# Patient Record
Sex: FEMALE | Race: WHITE | Employment: UNEMPLOYED | URBAN - METROPOLITAN AREA
[De-identification: names, ages, dates, MRNs, and addresses within clinical notes are randomized per-mention and may not be internally consistent; named-entity substitution may affect disease eponyms.]

---

## 2017-02-22 ENCOUNTER — OFFICE VISIT (OUTPATIENT)
Dept: FAMILY MEDICINE CLINIC | Age: 67
End: 2017-02-22

## 2017-02-22 VITALS
RESPIRATION RATE: 16 BRPM | HEIGHT: 60 IN | SYSTOLIC BLOOD PRESSURE: 116 MMHG | BODY MASS INDEX: 19.83 KG/M2 | HEART RATE: 59 BPM | TEMPERATURE: 95.7 F | OXYGEN SATURATION: 99 % | WEIGHT: 101 LBS | DIASTOLIC BLOOD PRESSURE: 55 MMHG

## 2017-02-22 DIAGNOSIS — Z13.39 SCREENING FOR ALCOHOLISM: ICD-10-CM

## 2017-02-22 DIAGNOSIS — Z71.89 ADVANCE DIRECTIVE DISCUSSED WITH PATIENT: ICD-10-CM

## 2017-02-22 DIAGNOSIS — Z00.00 ROUTINE GENERAL MEDICAL EXAMINATION AT A HEALTH CARE FACILITY: Primary | ICD-10-CM

## 2017-02-22 DIAGNOSIS — M81.0 OSTEOPOROSIS: ICD-10-CM

## 2017-02-22 NOTE — ACP (ADVANCE CARE PLANNING)
Advance Care Planning (ACP) Provider Conversation Snapshot    Date of ACP Conversation: 02/22/17  Persons included in Conversation:  patient and family  Length of ACP Conversation in minutes:  <16 minutes (Non-Billable)    Authorized Decision Maker (if patient is incapable of making informed decisions): This person is:    Other Legally Authorized Decision Maker (e.g. Next of Kin)her , Mindi Lung          For Patients with Decision Making Capacity:   Values/Goals: Exploration of values, goals, and preferences if recovery is not expected, even with continued medical treatment in the event of:  Imminent death  Severe, permanent brain injury    Conversation Outcomes / Follow-Up Plan:   Recommended completion of Advance Directive form after review of ACP materials and conversation with prospective healthcare agent

## 2017-02-22 NOTE — PATIENT INSTRUCTIONS

## 2017-02-22 NOTE — MR AVS SNAPSHOT
Visit Information Date & Time Provider Department Dept. Phone Encounter #  
 2/22/2017 10:45 AM Hayes Hines MD [facility] [address] [id number] Follow-up Instructions Return in about 3 months (around 5/22/2017) for follow up. Follow-up and Disposition History Your Appointments 5/22/2017 10:30 AM  
Follow Up with Hayes Hines MD  
[place] [address] ([facility]) Appt Note: 3 mo f/u  
 [facility] [address]  
  
   
 [address] [facility] Upcoming Health Maintenance Date Due Pneumococcal 65+ Low/Medium Risk (2 of 2 - PPSV23) 1/15/2015 GLAUCOMA SCREENING Q2Y 1/2/2017 BREAST CANCER SCRN MAMMOGRAM 2/10/2018 MEDICARE YEARLY EXAM 2/23/2018 COLONOSCOPY 1/1/2020 DTaP/Tdap/Td series (2 - Td) 8/14/2026 Allergies as of 2/22/2017  Review Complete On: 2/22/2017 By: Hayes Hines MD  
  
 Severity Noted Reaction Type Reactions Codeine  02/08/2012    Nausea Only Statins-hmg-coa Reductase Inhibitors  07/20/2016   Intolerance Other (comments) Leg pains, bone pains Current Immunizations  Reviewed on 2/26/2017 Name Date Influenza High Dose Vaccine PF 10/1/2016, 1/20/2016 Influenza Vaccine 9/4/2014, 10/7/2010 Pneumococcal Conjugate (PCV-13) 1/15/2014 Td 8/14/2016, 8/1/2012 Tdap 6/17/2015 12:55 PM  
  
 Reviewed by Hayes Hines MD on 2/22/2017 at 11:57 AM  
 Reviewed by Hayes Hines MD on 2/22/2017 at 12:01 PM  
 Reviewed by Hayes Hines MD on 2/26/2017 at 12:35 AM  
You Were Diagnosed With   
  
 Codes Comments Routine general medical examination at a health care facility    -  Primary ICD-10-CM: Z00.00 ICD-9-CM: V70.0 Screening for alcoholism     ICD-10-CM: Z13.89 ICD-9-CM: V79.1 Advance directive discussed with patient     ICD-10-CM: Z71.89 ICD-9-CM: V65.49 Osteoporosis     ICD-10-CM: M81.0 ICD-9-CM: 733.00 Vitals BP Pulse Temp Resp Height(growth percentile) Weight(growth percentile) 116/55 (!) 59 95.7 °F (35.4 °C) (Oral) 16 5' (1.524 m) 101 lb (45.8 kg) SpO2 BMI OB Status Smoking Status 99% 19.73 kg/m2 Hysterectomy Never Smoker Vitals History BMI and BSA Data Body Mass Index Body Surface Area  
 19.73 kg/m 2 1.39 m 2 Preferred Pharmacy Pharmacy Name Phone Leeann [facility] [address] [place] [address] [facility] [address] [place] [address] [phone] Your Updated Medication List  
  
   
This list is accurate as of: 2/22/17 11:59 PM.  Always use your most recent med list.  
  
  
  
  
 cholecalciferol (VITAMIN D3) 5,000 unit Tab tablet Commonly known as:  VITAMIN D3 Take  by mouth daily. digoxin 0.25 mg tablet Commonly known as:  LANOXIN Take  by mouth daily. furosemide 40 mg tablet Commonly known as:  LASIX Take 1 Tab by mouth daily. levothyroxine 50 mcg tablet Commonly known as:  SYNTHROID  
TAKE 1 TABLET DAILY BEFORE BREAKFAST  
  
 metoprolol tartrate 50 mg tablet Commonly known as:  LOPRESSOR Take 1 Tab by mouth daily. potassium chloride SR 10 mEq tablet Commonly known as:  KLOR-CON 10 Take 1 Tab by mouth daily. ULTRAM 50 mg tablet Generic drug:  traMADol Take 50 mg by mouth every six (6) hours as needed. warfarin 2 mg tablet Commonly known as:  COUMADIN Take 2 mg by mouth daily. Follow-up Instructions Return in about 3 months (around 5/22/2017) for follow up. Patient Instructions Medicare Wellness Visit, Female The best way to live healthy is to have a healthy lifestyle by eating a well-balanced diet, exercising regularly, limiting alcohol and stopping smoking. Regular physical exams and screening tests are another way to keep healthy.  Preventive exams provided by your health care provider can find health problems before they become diseases or illnesses. Preventive services including immunizations, screening tests, monitoring and exams can help you take care of your own health. All people over age 72 should have a pneumovax  and and a prevnar shot to prevent pneumonia. These are once in a lifetime unless you and your provider decide differently. All people over 65 should have a yearly flu shot and a tetanus vaccine every 10 years. A bone mass density to screen for osteoporosis or thinning of the bones should be done every 2 years after 65. Screening for diabetes mellitus with a blood sugar test should be done every year. Glaucoma is a disease of the eye due to increased ocular pressure that can lead to blindness and it should be done every year by an eye professional. 
 
Cardiovascular screening tests that check for elevated lipids (fatty part of blood) which can lead to heart disease and strokes should be done every 5 years. Colorectal screening that evaluates for blood or polyps in your colon should be done yearly as a stool test or every five years as a flexible sigmoidoscope or every 10 years as a colonoscopy up to age 76. Breast cancer screening with a mammogram is recommended biennially  for women age 54-69. Screening for cervical cancer with a pap smear and pelvic exam is recommended for women after age 72 years every 2 years up to age 79 or when the provider and patient decide to stop. If there is a history of cervical abnormalities or other increased risk for cancer then the test is recommended yearly. Hepatitis C screening is also recommended for anyone born between [birth year] through [address]. A shingles vaccine is also recommended once in a lifetime after age 61. Your Medicare Wellness Exam is recommended annually. Here is a list of your current Health Maintenance items with a due date: 
Health Maintenance Due Topic Date Due  
  Pneumococcal Vaccine (2 of 2 - PPSV23) 01/15/2015  Flu Vaccine  08/01/2016  Glaucoma Screening   01/02/2017 Jey Powell Annual Well Visit  01/20/2017 Introducing [facility]! [employer] introduces [employer] patient portal. Now you can access parts of your medical record, email your doctor's office, and request medication refills online. 1. In your internet browser, go to [email or web address] 2. Click on the First Time User? Click Here link in the Sign In box. You will see the New Member Sign Up page. 3. Enter your [employer] Access Code exactly as it appears below. You will not need to use this code after youve completed the sign-up process. If you do not sign up before the expiration date, you must request a new code. · [employer] Access Code: Vivian Ramos Expires: 5/23/2017  1:07 PM 
 
4. Enter the last four digits of your Social Security Number (xxxx) and Date of Birth (mm/dd/yyyy) as indicated and click Submit. You will be taken to the next sign-up page. 5. Create a [employer] ID. This will be your [employer] login ID and cannot be changed, so think of one that is secure and easy to remember. 6. Create a [employer] password. You can change your password at any time. 7. Enter your Password Reset Question and Answer. This can be used at a later time if you forget your password. 8. Enter your e-mail address. You will receive e-mail notification when new information is available in [address]. 9. Click Sign Up. You can now view and download portions of your medical record. 10. Click the Download Summary menu link to download a portable copy of your medical information. If you have questions, please visit the Frequently Asked Questions section of the [employer] website. Remember, [employer] is NOT to be used for urgent needs. For medical emergencies, dial 911. Now available from your iPhone and Android! Please provide this summary of care documentation to your next provider. Your primary care clinician is listed as Ty Ramos. If you have any questions after today's visit, please call [phone].

## 2017-02-22 NOTE — PROGRESS NOTES
Shima Sanjoyce is here today to follow up for chronic conditions and a medicare wellness exam.     1. Have you been to the ER, urgent care clinic since your last visit? Hospitalized since your last visit? No    2. Have you seen or consulted any other health care providers outside of the [facility] since your last visit? Include any pap smears or colon screening.  No

## 2017-02-22 NOTE — PROGRESS NOTES
This is a Subsequent Medicare Annual Wellness Visit providing Personalized Prevention Plan Services (PPPS) (Performed 12 months after initial AWV and PPPS )    I have reviewed the patient's medical history in detail and updated the computerized patient record. Health Maintenance Due   Topic Date Due    Pneumococcal 65+ Low/Medium Risk (2 of 2 - PPSV23) 01/15/2015    GLAUCOMA SCREENING Q2Y  01/02/2017     BONE DENSITY STUDY - CENTRAL2/10/2016  [facility]  Result Impression   Impression:    BMD MEASURES  CONSISTENT WITH OSTEOPOROSIS. Declines tx    MAMMO-DIGITAL SCREENING BILAT W/ TOMOSYNTHESIS2/10/2016  [facility]  Result Impression   Impression:    No evidence for malignancy. Health Maintenance Due   Topic Date Due    Pneumococcal 65+ Low/Medium Risk (2 of 2 - PPSV23) 01/15/2015    GLAUCOMA SCREENING Q2Y  01/02/2017     History     Past Medical History:   Diagnosis Date    Arrhythmia     atrial fibrillation/has mitral valve disease from hx of rheumatic fever    Contact dermatitis and other eczema, due to unspecified cause     Headache(784.0)     hx migraines    Hypercholesterolemia     Hypertension     Neurofibromatosis, unspecified     Renal mass 11/6/2012    Thyroid disease       Past Surgical History:   Procedure Laterality Date    ENDOSCOPY, COLON, DIAGNOSTIC      colon surgery lg & sm intestine    HX CHOLECYSTECTOMY      HX GI      for neurofibromatosis    HX GYN      hysterectomy for bleeding     Current Outpatient Prescriptions   Medication Sig Dispense Refill    levothyroxine (SYNTHROID) 50 mcg tablet TAKE 1 TABLET DAILY BEFORE BREAKFAST 90 Tab 3    cholecalciferol, VITAMIN D3, (VITAMIN D3) 5,000 unit tab tablet Take  by mouth daily.  metoprolol (LOPRESSOR) 50 mg tablet Take 1 Tab by mouth daily. 90 Tab 3    furosemide (LASIX) 40 mg tablet Take 1 Tab by mouth daily. 90 Tab 3    potassium chloride SR (KLOR-CON 10) 10 mEq tablet Take 1 Tab by mouth daily.  80 Tab 3    warfarin (COUMADIN) 2 mg tablet Take 2 mg by mouth daily.  digoxin (LANOXIN) 0.25 mg tablet Take  by mouth daily.  traMADol (ULTRAM) 50 mg tablet Take 50 mg by mouth every six (6) hours as needed. Allergies   Allergen Reactions    Codeine Nausea Only    Statins-Hmg-Coa Reductase Inhibitors Other (comments)     Leg pains, bone pains     Family History   Problem Relation Age of Onset    Hypertension Mother     Cancer Sister      APPENDIX    Cancer Brother      metastic cancer in remission     Cancer Sister      \"it hardened her arteries\"   [facility] Cancer Sister      colon     Cancer Sister     Cancer Sister      Social History   Substance Use Topics    Smoking status: Never Smoker    Smokeless tobacco: Never Used    Alcohol use No     Patient Active Problem List   Diagnosis Code    Hypercholesterolemia E78.00    Hypertension I10    Neurofibromatosis, unspecified     Vitamin D deficiency E55.9    Incidental lung nodule, less than or equal to 3mm R91.1    Adrenal nodule ([facility] [address] E27.9    Hypothyroidism due to acquired atrophy of thyroid E03.4    Renal cyst N28.1    Advance directive discussed with patient Z71.89    Chronic atrial fibrillation ([facility] [address] I48.2    Osteoporosis M81.0       Depression Risk Factor Screening:     PHQ 2 / 9, over the last two weeks 2/22/2017   Little interest or pleasure in doing things Not at all   Feeling down, depressed or hopeless Not at all   Total Score PHQ 2 0     Alcohol Risk Factor Screening: On any occasion during the past 3 months, have you had more than 3 drinks containing alcohol? No    Do you average more than 7 drinks per week? No      Functional Ability and Level of Safety:     Hearing Loss   none    Activities of Daily Living   Self-care. Requires assistance with: no ADLs    Fall Risk     Fall Risk Assessment, last 12 mths 2/22/2017   Able to walk? Yes   Fall in past 12 months? Yes   Fall with injury?  Yes   Number of falls in past 12 months 1   Fall Risk Score 2     Abuse Screen   Patient is not abused    Review of Systems   A comprehensive review of systems was negative except for that written in the HPI. Physical Examination     Evaluation of Cognitive Function:  Mood/affect:  happy  Appearance: age appropriate  Family member/caregiver input: not applicable    Visit Vitals    /55    Pulse (!) 59    Temp 95.7 °F (35.4 °C) (Oral)    Resp 16    Ht 5' (1.524 m)    Wt 101 lb (45.8 kg)    SpO2 99%    BMI 19.73 kg/m2     General:  Alert, cooperative, no distress, appears stated age. Head:  Normocephalic, without obvious abnormality, atraumatic. Eyes:  Conjunctivae/corneas clear. PERRL, EOMs intact. Ears:  Normal TMs and external ear canals both ears. Nose: Nares normal. Septum midline. Mucosa normal. No drainage or sinus tenderness. Throat: Lips, mucosa, and tongue normal. Teeth and gums normal.   Neck: Supple, symmetrical, trachea midline, no adenopathy, thyroid: no enlargement/tenderness/nodules, no carotid bruit and no JVD. Back:   Symmetric, no curvature. ROM normal. No CVA tenderness. No spine tenderness   Lungs:   Clear to auscultation bilaterally. Chest wall:  No tenderness or deformity. Heart:  Irregularly irregular rhythm   Breast Exam:  Not done   Abdomen:   Soft, non-tender. Bowel sounds normal. No masses,  No organomegaly. Genitalia:  Normal female without lesion, discharge or tenderness. Rectal:  Normal tone,  no masses or tenderness  Guaiac negative stool. Extremities: Extremities normal, atraumatic, no cyanosis or edema. Pulses: 2+ and symmetric all extremities. Skin: Skin color, texture, turgor normal. No rashes. Neurofibromas noted all over   Lymph nodes: Cervical, supraclavicular, and axillary nodes normal.   Neurologic: CNII-XII intact. Normal strength, sensation and reflexes throughout.        Patient Care Team:  Nadir Villeda MD as PCP - General (Internal Medicine)  Rigo Cuellar MD (Cardiology)    Advice/Referrals/Counseling   Education and counseling provided:  End-of-Life planning (with patient's consent)-see ACP note  Pneumococcal Vaccine-needs Pneumovax  Influenza Vaccine-done  Screening Mammography-due  Screening Pap and pelvic (covered once every 2 years)-up to date  Colorectal cancer screening tests-does not like the prep  Cardiovascular screening blood test  Bone mass measurement (DEXA)-osteoporosis, declines tx  Screening for glaucoma-wears eyeglasses, advised to schedule eye exam    Assessment/Plan     Trina Laura was seen today for annual wellness visit, hypertension, hypothyroidism, cholesterol problem and vitamin d deficiency. Diagnoses and all orders for this visit:    Routine general medical examination at a health care facility-Refer to above for plan and to patient instructions for recommendations on HM  Advised to get Pneumovax next visit here or at her pharmacy  Advised to schedule mammogram and eye exam  Will give her the FIT stool test next visit    Screening for alcoholism-negative    Advance directive discussed with patient-see ACP note    Osteoporosis-declines tx; advised to walk for exercise, Ca+D daily    . RTC yearly for wellness visit    Follow-up Disposition:  Return in about 3 months (around 5/22/2017) for follow up.

## 2017-02-26 PROBLEM — M81.0 OSTEOPOROSIS: Status: ACTIVE | Noted: 2017-02-26

## 2017-03-20 ENCOUNTER — TELEPHONE (OUTPATIENT)
Dept: FAMILY MEDICINE CLINIC | Age: 67
End: 2017-03-20

## 2017-03-20 NOTE — TELEPHONE ENCOUNTER
Patient called with concerns regarding the \"screening for alcoholism\" diagnosis on her AVS. Made patient aware that this is not stating she is an alcoholic but addressing that questions were asked regarding how many drinks she has one a 3 month basis. Patient stated this was inappropriate, and she felt as though we were calling her an alcoholic and states she chooses not to participate in [address] anymore.

## 2017-06-09 RX ORDER — LEVOTHYROXINE SODIUM 50 UG/1
TABLET ORAL
Qty: 90 TAB | Refills: 3 | Status: SHIPPED | OUTPATIENT
Start: 2017-06-09 | End: 2018-07-07 | Stop reason: SDUPTHER

## 2017-07-05 ENCOUNTER — OFFICE VISIT (OUTPATIENT)
Dept: FAMILY MEDICINE CLINIC | Age: 67
End: 2017-07-05

## 2017-07-05 VITALS
OXYGEN SATURATION: 100 % | RESPIRATION RATE: 17 BRPM | HEART RATE: 65 BPM | TEMPERATURE: 95.4 F | WEIGHT: 102.6 LBS | HEIGHT: 61 IN | BODY MASS INDEX: 19.37 KG/M2 | SYSTOLIC BLOOD PRESSURE: 122 MMHG | DIASTOLIC BLOOD PRESSURE: 54 MMHG

## 2017-07-05 DIAGNOSIS — E03.4 HYPOTHYROIDISM DUE TO ACQUIRED ATROPHY OF THYROID: ICD-10-CM

## 2017-07-05 DIAGNOSIS — Z23 ENCOUNTER FOR IMMUNIZATION: ICD-10-CM

## 2017-07-05 DIAGNOSIS — E55.9 VITAMIN D DEFICIENCY: ICD-10-CM

## 2017-07-05 DIAGNOSIS — E78.00 HYPERCHOLESTEROLEMIA: ICD-10-CM

## 2017-07-05 DIAGNOSIS — I10 ESSENTIAL HYPERTENSION: Primary | ICD-10-CM

## 2017-07-05 DIAGNOSIS — I48.20 CHRONIC ATRIAL FIBRILLATION (HCC): ICD-10-CM

## 2017-07-05 NOTE — PROGRESS NOTES
1. Have you been to the ER, urgent care clinic since your last visit? Hospitalized since your last visit? No    2. Have you seen or consulted any other health care providers outside of the [address] [place] [address] since your last visit? Include any pap smears or colon screening. No       Patient here for 3 month follow-up. Patient needs no refills on any medications at this time.

## 2017-07-05 NOTE — MR AVS SNAPSHOT
Visit Information Date & Time Provider Department Dept. Phone Encounter #  
 7/5/2017  1:00 PM Hoang Duarte MD [facility] 13 [id number] Follow-up Instructions Return in about 6 months (around 1/5/2018) for follow up. Your Appointments 2/22/2018 10:45 AM  
Office Visit with Hoang Duarte MD  
Liana Pascal ([facility]) Appt Note: mwv  
 [address] [place]  
  
   
 [address] Upcoming Health Maintenance Date Due Pneumococcal 65+ Low/Medium Risk (2 of 2 - PPSV23) 1/15/2015 INFLUENZA AGE 9 TO ADULT 8/1/2017 MEDICARE YEARLY EXAM 2/23/2018 BREAST CANCER SCRN MAMMOGRAM 3/21/2019 GLAUCOMA SCREENING Q2Y 5/1/2019 COLONOSCOPY 1/1/2020 DTaP/Tdap/Td series (2 - Td) 8/14/2026 Allergies as of 7/5/2017  Review Complete On: 7/5/2017 By: Hoang Duarte MD  
  
 Severity Noted Reaction Type Reactions Codeine  02/08/2012    Nausea Only Statins-hmg-coa Reductase Inhibitors  07/20/2016   Intolerance Other (comments) Leg pains, bone pains Current Immunizations  Reviewed on 7/5/2017 Name Date Influenza High Dose Vaccine PF 10/1/2016, 1/20/2016 Influenza Vaccine 9/4/2014, 10/7/2010 Pneumococcal Conjugate (PCV-13) 1/15/2014 Td 8/14/2016, 8/1/2012 Tdap 6/17/2015 12:55 PM  
  
 Reviewed by Hoang Duarte MD on 7/5/2017 at 12:54 PM  
 Reviewed by Hoang Duarte MD on 7/5/2017 at  1:11 PM  
You Were Diagnosed With   
  
 Codes Comments Essential hypertension    -  Primary ICD-10-CM: I10 
ICD-9-CM: 401.9 Chronic atrial fibrillation (HCC)     ICD-10-CM: M50.4 ICD-9-CM: 427.31 Hypothyroidism due to acquired atrophy of thyroid     ICD-10-CM: E03.4 ICD-9-CM: 244.8, 246.8 Vitamin D deficiency     ICD-10-CM: E55.9 ICD-9-CM: 268.9  Hypercholesterolemia     ICD-10-CM: E78.00 
 ICD-9-CM: 272.0 Vitals BP Pulse Temp Resp Height(growth percentile) Weight(growth percentile) 122/54 65 95.4 °F (35.2 °C) (Oral) 17 5' 1\" (1.549 m) 102 lb 9.6 oz (46.5 kg) SpO2 BMI OB Status Smoking Status 100% 19.39 kg/m2 Hysterectomy Never Smoker BMI and BSA Data Body Mass Index Body Surface Area  
 19.39 kg/m 2 1.41 m 2 Preferred Pharmacy Pharmacy Name Phone Leeann Webb [address] [place] [address] [address] [phone] Your Updated Medication List  
  
   
This list is accurate as of: 7/5/17  1:17 PM.  Always use your most recent med list.  
  
  
  
  
 cholecalciferol (VITAMIN D3) 5,000 unit Tab tablet Commonly known as:  VITAMIN D3 Take  by mouth daily. digoxin 0.25 mg tablet Commonly known as:  LANOXIN Take  by mouth daily. furosemide 40 mg tablet Commonly known as:  LASIX Take 1 Tab by mouth daily. levothyroxine 50 mcg tablet Commonly known as:  SYNTHROID  
TAKE 1 TABLET DAILY BEFORE BREAKFAST  
  
 metoprolol tartrate 50 mg tablet Commonly known as:  LOPRESSOR Take 1 Tab by mouth daily. potassium chloride SR 10 mEq tablet Commonly known as:  KLOR-CON 10 Take 1 Tab by mouth daily. ULTRAM 50 mg tablet Generic drug:  traMADol Take 50 mg by mouth every six (6) hours as needed. warfarin 2 mg tablet Commonly known as:  COUMADIN Take 2 mg by mouth daily. Follow-up Instructions Return in about 6 months (around 1/5/2018) for follow up. To-Do List   
 07/05/2017 Lab:  CBC WITH AUTOMATED DIFF   
  
 07/05/2017 Lab:  LIPID PANEL   
  
 07/05/2017 Lab:  METABOLIC PANEL, COMPREHENSIVE   
  
 07/05/2017 Lab:  TSH 3RD GENERATION   
  
 07/05/2017 Lab:  VITAMIN D, 25 HYDROXY Patient Instructions Atrial Fibrillation: Care Instructions Your Care Instructions Atrial fibrillation is an irregular and often fast heartbeat. Treating this condition is important for several reasons. It can cause blood clots, which can travel from your heart to your brain and cause a stroke. If you have a fast heartbeat, you may feel lightheaded, dizzy, and weak. An irregular heartbeat can also increase your risk for heart failure. Atrial fibrillation is often the result of another heart condition, such as high blood pressure or coronary artery disease. Making changes to improve your heart condition will help you stay healthy and active. Follow-up care is a key part of your treatment and safety. Be sure to make and go to all appointments, and call your doctor if you are having problems. It's also a good idea to know your test results and keep a list of the medicines you take. How can you care for yourself at home? Medicines · Take your medicines exactly as prescribed. Call your doctor if you think you are having a problem with your medicine. You will get more details on the specific medicines your doctor prescribes. · If your doctor has given you a blood thinner to prevent a stroke, be sure you get instructions about how to take your medicine safely. Blood thinners can cause serious bleeding problems. · Do not take any vitamins, over-the-counter drugs, or herbal products without talking to your doctor first. 
Lifestyle changes · Do not smoke. Smoking can increase your chance of a stroke and heart attack. If you need help quitting, talk to your doctor about stop-smoking programs and medicines. These can increase your chances of quitting for good. · Eat a heart-healthy diet. · Stay at a healthy weight. Lose weight if you need to. · Limit alcohol to 2 drinks a day for men and 1 drink a day for women. Too much alcohol can cause health problems. · Avoid colds and flu. Get a pneumococcal vaccine shot.  If you have had one before, ask your doctor whether you need another dose. Get a flu shot every year. If you must be around people with colds or flu, wash your hands often. Activity · If your doctor recommends it, get more exercise. Walking is a good choice. Bit by bit, increase the amount you walk every day. Try for at least 30 minutes on most days of the week. You also may want to swim, bike, or do other activities. Your doctor may suggest that you join a cardiac rehabilitation program so that you can have help increasing your physical activity safely. · Start light exercise if your doctor says it is okay. Even a small amount will help you get stronger, have more energy, and manage stress. Walking is an easy way to get exercise. Start out by walking a little more than you did in the hospital. Gradually increase the amount you walk. · When you exercise, watch for signs that your heart is working too hard. You are pushing too hard if you cannot talk while you are exercising. If you become short of breath or dizzy or have chest pain, sit down and rest immediately. · Check your pulse regularly. Place two fingers on the artery at the palm side of your wrist, in line with your thumb. If your heartbeat seems uneven or fast, talk to your doctor. When should you call for help? Call 911 anytime you think you may need emergency care. For example, call if: 
· You have symptoms of a heart attack. These may include: ¨ Chest pain or pressure, or a strange feeling in the chest. 
¨ Sweating. ¨ Shortness of breath. ¨ Nausea or vomiting. ¨ Pain, pressure, or a strange feeling in the back, neck, jaw, or upper belly or in one or both shoulders or arms. ¨ Lightheadedness or sudden weakness. ¨ A fast or irregular heartbeat. After you call 911, the  may tell you to chew 1 adult-strength or 2 to 4 low-dose aspirin. Wait for an ambulance. Do not try to drive yourself. · You have symptoms of a stroke. These may include: ¨ Sudden numbness, tingling, weakness, or loss of movement in your face, arm, or leg, especially on only one side of your body. ¨ Sudden vision changes. ¨ Sudden trouble speaking. ¨ Sudden confusion or trouble understanding simple statements. ¨ Sudden problems with walking or balance. ¨ A sudden, severe headache that is different from past headaches. · You passed out (lost consciousness). Call your doctor now or seek immediate medical care if: 
· You have new or increased shortness of breath. · You feel dizzy or lightheaded, or you feel like you may faint. · Your heart rate becomes irregular. · You can feel your heart flutter in your chest or skip heartbeats. Tell your doctor if these symptoms are new or worse. Watch closely for changes in your health, and be sure to contact your doctor if you have any problems. Where can you learn more? Go to http://libradoQueerfeed Mediaalaina.info/. Enter U020 in the search box to learn more about \"Atrial Fibrillation: Care Instructions. \" Current as of: September 21, 2016 Content Version: 11.3 © 3526-7181 [employer]. Care instructions adapted under license by [employer] (which disclaims liability or warranty for this information). If you have questions about a medical condition or this instruction, always ask your healthcare professional. [address] any warranty or liability for your use of this information. Deciding Between Electrical Cardioversion and Rate Control Medicines for Atrial Fibrillation What is atrial fibrillation? Atrial fibrillation (say \"AY-tree-lizzette gmg-tdpt-KJY-shun\") is a kind of uneven heartbeat. It can make you feel lightheaded and dizzy. You may feel weak. It also can make you more likely to have a stroke. Electrical cardioversion can return your heart to a normal rhythm. First you'll get medicines to make you sleepy and control pain.  Then your doctor will use patches to send an electric current to your heart. This resets the rhythm of your heart. Not everyone with atrial fibrillation needs this treatment. For some people, taking medicines may be better. Most people can live with an uneven heartbeat. It just has to be kept under control so the heart does not beat too fast. 
Use this information to help you and your doctor decide which treatment to choose for atrial fibrillation. What are de los santos points about this decision? · Electrical cardioversion can return your heart to a normal rhythm. But the problem can come back. The longer you have had atrial fibrillation, the more likely it is to come back after this treatment. · Cardioversion may not work as well when an uneven heartbeat is caused by another heart disease, such as heart failure. · If your symptoms bother you a lot, you may want to try cardioversion. But even if it works, you may still need to take blood thinners to prevent a stroke. · If you don't have symptoms, or if they don't bother you much, you can try medicines to slow your heart rate. And you can take blood thinners to prevent a stroke. · Cardioversion does have risks, such as stroke. Discuss the risks with your doctor. Make sure you understand them. · You may have more than one heart problem. Cardioversion doesn't work as well if you have more than one heart problem. Why might you choose electrical cardioversion? · It restores the normal heart rhythm for most people. · The idea of having an electric shock does not bother you. · Your symptoms bother you a lot. · You have had atrial fibrillation just one time. · You do not have other heart problems. · You may not have to take as many medicines. Or you may not need to take them as long. Why might you choose rate-control medicines? · These medicines keep many people from having symptoms. · You prefer to take medicines rather than have an electric shock. · Your symptoms don't bother you much. · If these medicines don't work, you can still try electrical cardioversion. Your decision Thinking about the facts and your feelings can help you make a decision that is right for you. Be sure you understand the benefits and risks of your options. And think about what else you need to do before you make the decision. Where can you learn more? Go to http://librado-alaina.info/. Enter P153 in the search box to learn more about \"Deciding Between Electrical Cardioversion and Rate Control Medicines for Atrial Fibrillation. \" Current as of: April 3, 2017 Content Version: 11.3 © 4540-0478 [employer]. Care instructions adapted under license by [employer] (which disclaims liability or warranty for this information). If you have questions about a medical condition or this instruction, always ask your healthcare professional. [address] any warranty or liability for your use of this information. Introducing \[facility] [place] [facility]\"" & HEALTH SERVICES! Aracely Addison introduces [facility] patient portal. Now you can access parts of your medical record, email your doctor's office, and request medication refills online. 1. In your internet browser, go to [email or web address] 2. Click on the First Time User? Click Here link in the Sign In box. You will see the New Member Sign Up page. 3. Enter your [facility]over Access Code exactly as it appears below. You will not need to use this code after youve completed the sign-up process. If you do not sign up before the expiration date, you must request a new code. · [facility] Access Code: [email or web address] Expires: 10/3/2017  1:17 PM 
 
4. Enter the last four digits of your Social Security Number (xxxx) and Date of Birth (mm/dd/yyyy) as indicated and click Submit. You will be taken to the next sign-up page. 5. Create a Kreix ID. This will be your Kreix login ID and cannot be changed, so think of one that is secure and easy to remember. 6. Create a Kreix password. You can change your password at any time. 7. Enter your Password Reset Question and Answer. This can be used at a later time if you forget your password. 8. Enter your e-mail address. You will receive e-mail notification when new information is available in [address] 9. Click Sign Up. You can now view and download portions of your medical record. 10. Click the Download Summary menu link to download a portable copy of your medical information. If you have questions, please visit the Frequently Asked Questions section of the Kreix website. Remember, Kreix is NOT to be used for urgent needs. For medical emergencies, dial 911. Now available from your iPhone and Android! Please provide this summary of care documentation to your next provider. Your primary care clinician is listed as Dania Collado. If you have any questions after today's visit, please call [phone].

## 2017-07-05 NOTE — PATIENT INSTRUCTIONS
Atrial Fibrillation: Care Instructions  Your Care Instructions    Atrial fibrillation is an irregular and often fast heartbeat. Treating this condition is important for several reasons. It can cause blood clots, which can travel from your heart to your brain and cause a stroke. If you have a fast heartbeat, you may feel lightheaded, dizzy, and weak. An irregular heartbeat can also increase your risk for heart failure. Atrial fibrillation is often the result of another heart condition, such as high blood pressure or coronary artery disease. Making changes to improve your heart condition will help you stay healthy and active. Follow-up care is a key part of your treatment and safety. Be sure to make and go to all appointments, and call your doctor if you are having problems. It's also a good idea to know your test results and keep a list of the medicines you take. How can you care for yourself at home? Medicines  · Take your medicines exactly as prescribed. Call your doctor if you think you are having a problem with your medicine. You will get more details on the specific medicines your doctor prescribes. · If your doctor has given you a blood thinner to prevent a stroke, be sure you get instructions about how to take your medicine safely. Blood thinners can cause serious bleeding problems. · Do not take any vitamins, over-the-counter drugs, or herbal products without talking to your doctor first.  Lifestyle changes  · Do not smoke. Smoking can increase your chance of a stroke and heart attack. If you need help quitting, talk to your doctor about stop-smoking programs and medicines. These can increase your chances of quitting for good. · Eat a heart-healthy diet. · Stay at a healthy weight. Lose weight if you need to. · Limit alcohol to 2 drinks a day for men and 1 drink a day for women. Too much alcohol can cause health problems. · Avoid colds and flu. Get a pneumococcal vaccine shot.  If you have had one before, ask your doctor whether you need another dose. Get a flu shot every year. If you must be around people with colds or flu, wash your hands often. Activity  · If your doctor recommends it, get more exercise. Walking is a good choice. Bit by bit, increase the amount you walk every day. Try for at least 30 minutes on most days of the week. You also may want to swim, bike, or do other activities. Your doctor may suggest that you join a cardiac rehabilitation program so that you can have help increasing your physical activity safely. · Start light exercise if your doctor says it is okay. Even a small amount will help you get stronger, have more energy, and manage stress. Walking is an easy way to get exercise. Start out by walking a little more than you did in the hospital. Gradually increase the amount you walk. · When you exercise, watch for signs that your heart is working too hard. You are pushing too hard if you cannot talk while you are exercising. If you become short of breath or dizzy or have chest pain, sit down and rest immediately. · Check your pulse regularly. Place two fingers on the artery at the palm side of your wrist, in line with your thumb. If your heartbeat seems uneven or fast, talk to your doctor. When should you call for help? Call 911 anytime you think you may need emergency care. For example, call if:  · You have symptoms of a heart attack. These may include:  ¨ Chest pain or pressure, or a strange feeling in the chest.  ¨ Sweating. ¨ Shortness of breath. ¨ Nausea or vomiting. ¨ Pain, pressure, or a strange feeling in the back, neck, jaw, or upper belly or in one or both shoulders or arms. ¨ Lightheadedness or sudden weakness. ¨ A fast or irregular heartbeat. After you call 911, the  may tell you to chew 1 adult-strength or 2 to 4 low-dose aspirin. Wait for an ambulance. Do not try to drive yourself. · You have symptoms of a stroke.  These may include:  ¨ Sudden numbness, tingling, weakness, or loss of movement in your face, arm, or leg, especially on only one side of your body. ¨ Sudden vision changes. ¨ Sudden trouble speaking. ¨ Sudden confusion or trouble understanding simple statements. ¨ Sudden problems with walking or balance. ¨ A sudden, severe headache that is different from past headaches. · You passed out (lost consciousness). Call your doctor now or seek immediate medical care if:  · You have new or increased shortness of breath. · You feel dizzy or lightheaded, or you feel like you may faint. · Your heart rate becomes irregular. · You can feel your heart flutter in your chest or skip heartbeats. Tell your doctor if these symptoms are new or worse. Watch closely for changes in your health, and be sure to contact your doctor if you have any problems. Where can you learn more? Go to http://libradoWeVorcealaina.info/. Enter U020 in the search box to learn more about \"Atrial Fibrillation: Care Instructions. \"  Current as of: September 21, 2016  Content Version: 11.3  © 7061-5250 [employer]. Care instructions adapted under license by [employer] (which disclaims liability or warranty for this information). If you have questions about a medical condition or this instruction, always ask your healthcare professional. [address] any warranty or liability for your use of this information. Deciding Between Electrical Cardioversion and Rate Control Medicines for Atrial Fibrillation  What is atrial fibrillation? Atrial fibrillation (say \"AY-tree-lizzette xlo-qohy-AGQ-shun\") is a kind of uneven heartbeat. It can make you feel lightheaded and dizzy. You may feel weak. It also can make you more likely to have a stroke. Electrical cardioversion can return your heart to a normal rhythm. First you'll get medicines to make you sleepy and control pain.  Then your doctor will use patches to send an electric current to your heart. This resets the rhythm of your heart. Not everyone with atrial fibrillation needs this treatment. For some people, taking medicines may be better. Most people can live with an uneven heartbeat. It just has to be kept under control so the heart does not beat too fast.  Use this information to help you and your doctor decide which treatment to choose for atrial fibrillation. What are de los santos points about this decision? · Electrical cardioversion can return your heart to a normal rhythm. But the problem can come back. The longer you have had atrial fibrillation, the more likely it is to come back after this treatment. · Cardioversion may not work as well when an uneven heartbeat is caused by another heart disease, such as heart failure. · If your symptoms bother you a lot, you may want to try cardioversion. But even if it works, you may still need to take blood thinners to prevent a stroke. · If you don't have symptoms, or if they don't bother you much, you can try medicines to slow your heart rate. And you can take blood thinners to prevent a stroke. · Cardioversion does have risks, such as stroke. Discuss the risks with your doctor. Make sure you understand them. · You may have more than one heart problem. Cardioversion doesn't work as well if you have more than one heart problem. Why might you choose electrical cardioversion? · It restores the normal heart rhythm for most people. · The idea of having an electric shock does not bother you. · Your symptoms bother you a lot. · You have had atrial fibrillation just one time. · You do not have other heart problems. · You may not have to take as many medicines. Or you may not need to take them as long. Why might you choose rate-control medicines? · These medicines keep many people from having symptoms. · You prefer to take medicines rather than have an electric shock. · Your symptoms don't bother you much.   · If these medicines don't work, you can still try electrical cardioversion. Your decision  Thinking about the facts and your feelings can help you make a decision that is right for you. Be sure you understand the benefits and risks of your options. And think about what else you need to do before you make the decision. Where can you learn more? Go to http://librado-alaina.info/. Enter H229 in the search box to learn more about \"Deciding Between Electrical Cardioversion and Rate Control Medicines for Atrial Fibrillation. \"  Current as of: April 3, 2017  Content Version: 11.3  © 9261-8344 [employer]. Care instructions adapted under license by [employer] (which disclaims liability or warranty for this information). If you have questions about a medical condition or this instruction, always ask your healthcare professional. [address] any warranty or liability for your use of this information.

## 2017-07-05 NOTE — PROGRESS NOTES
Chief Complaint   Patient presents with    Follow-up     3 month       Pt is a 69y.o. year old female who presents for follow up of her chronic medical problems    Health Maintenance Due   Topic Date Due    Pneumococcal 65+ Low/Medium Risk (2 of 2 - PPSV23) 01/15/2015   Eye exam done where-at [facility], has eyeglasses    Lab Results   Component Value Date/Time    TSH 2.310 07/20/2016 09:25 AM   On Synthroid    Wt Readings from Last 3 Encounters:   07/05/17 102 lb 9.6 oz (46.5 kg)   02/22/17 101 lb (45.8 kg)   08/31/16 98 lb (44.5 kg)       Lab Results   Component Value Date/Time    Cholesterol, total 198 07/20/2016 09:25 AM    HDL Cholesterol 24 07/20/2016 09:25 AM    LDL,Direct 143 08/26/2013 09:16 AM    LDL, calculated 126 07/20/2016 09:25 AM    VLDL, calculated 48 07/20/2016 09:25 AM    Triglyceride 240 07/20/2016 09:25 AM    CHOL/HDL Ratio 7.5 01/20/2016 09:50 AM   Fasting today      BP Readings from Last 3 Encounters:   07/05/17 122/54   02/22/17 116/55   08/31/16 105/59     Still on meds for Afib  Sees Cardio once every yr-Coumadin monitoring at home and c/o Cardio      ROS:    Pt denies: Wt loss, Fever/Chills, HA, Visual changes, Fatigue, Chest pain, SOB, GONZALES, Abd pain, N/V/D/C, Blood in stool or urine, Edema. Pertinent positive as above in HPI.  All others were negative    Patient Active Problem List   Diagnosis Code    Hypercholesterolemia E78.00    Hypertension I10    Neurofibromatosis, unspecified     Vitamin D deficiency E55.9    Incidental lung nodule, less than or equal to 3mm R91.1    Adrenal nodule ([address] E27.9    Hypothyroidism due to acquired atrophy of thyroid E03.4    Renal cyst N28.1    Advance directive discussed with patient Z71.89    Chronic atrial fibrillation (HCC) I48.2    Osteoporosis M81.0       Past Medical History:   Diagnosis Date    Arrhythmia     atrial fibrillation/has mitral valve disease from hx of rheumatic fever    Contact dermatitis and other eczema, due to unspecified cause     Headache     hx migraines    Hypercholesterolemia     Hypertension     Neurofibromatosis, unspecified     Renal mass 11/6/2012    Thyroid disease        Current Outpatient Prescriptions   Medication Sig Dispense Refill    levothyroxine (SYNTHROID) 50 mcg tablet TAKE 1 TABLET DAILY BEFORE BREAKFAST 90 Tab 3    traMADol (ULTRAM) 50 mg tablet Take 50 mg by mouth every six (6) hours as needed.  cholecalciferol, VITAMIN D3, (VITAMIN D3) 5,000 unit tab tablet Take  by mouth daily.  metoprolol (LOPRESSOR) 50 mg tablet Take 1 Tab by mouth daily. 90 Tab 3    furosemide (LASIX) 40 mg tablet Take 1 Tab by mouth daily. 90 Tab 3    potassium chloride SR (KLOR-CON 10) 10 mEq tablet Take 1 Tab by mouth daily. 90 Tab 3    warfarin (COUMADIN) 2 mg tablet Take 2 mg by mouth daily.  digoxin (LANOXIN) 0.25 mg tablet Take  by mouth daily. History   Smoking Status    Never Smoker   Smokeless Tobacco    Never Used       Allergies   Allergen Reactions    Codeine Nausea Only    Statins-Hmg-Coa Reductase Inhibitors Other (comments)     Leg pains, bone pains       Patient Labs were reviewed: yes      Patient Past Records were reviewed:  yes        Objective:     Vitals:    07/05/17 1244   BP: 122/54   Pulse: 65   Resp: 17   Temp: 95.4 °F (35.2 °C)   TempSrc: Oral   SpO2: 100%   Weight: 102 lb 9.6 oz (46.5 kg)   Height: 5' 1\" (1.549 m)     Body mass index is 19.39 kg/(m^2). Exam:   Appearance: alert, well appearing,  oriented to person, place, and time, acyanotic, in no respiratory distress and well hydrated.   HEENT:  NC/AT, pink conj, anicteric sclerae  Neck:  No cervical lymphadenopathy, no JVD, no thyromegaly, no carotid bruit  Heart:  Irregularly irregular rhythm  Lungs:  CTAB, no rhonchi, rales, or wheezes with good air exchange   Abdomen:  Non-tender, pos bowel sounds, no hepatosplenomegaly  Ext:  No C/C/E    Skin: no rash, multiple neurofibromas  Neuro: no lateralizing signs, CNs II-XII intact      Assessment/ Plan:   Doris House was seen today for follow-up. Diagnoses and all orders for this visit:    Essential hypertension-controlled, continue with present meds  -     CBC WITH AUTOMATED DIFF; Future  -     METABOLIC PANEL, COMPREHENSIVE; Future    Chronic atrial fibrillation (HCC)-on Coumadin per Cardio    Hypothyroidism due to acquired atrophy of thyroid-on Synthroid  -     TSH 3RD GENERATION; Future    Vitamin D deficiency-on OTC Vit D/ins does not cover rx; advised sun exposure  -     VITAMIN D, 25 HYDROXY; Future    Hypercholesterolemia-intolerant to statins  -     LIPID PANEL; Future    Encounter for immunization  -     Pneumococcal Polysaccharide vaccine, 23-Valent, Adult or Immunocompromised  -     ADMIN PNEUMOCOCCAL VACCINE  Medicare Injection Admin Charge      Follow-up Disposition:  Return in about 6 months (around 1/5/2018) for follow up. I have discussed the diagnosis with the patient and the intended plan as seen in the above orders. The patient has received an After-Visit Summary and questions were answered concerning future plans. Medication Side Effects and Warnings were discussed with patient: yes    Patient verbalized understanding of above instructions.     Clarence Johnson MD  Internal Medicine  [facility]

## 2017-07-06 LAB
25(OH)D3+25(OH)D2 SERPL-MCNC: 19.1 NG/ML (ref 30–100)
ALBUMIN SERPL-MCNC: 4.1 G/DL (ref 3.6–4.8)
ALBUMIN/GLOB SERPL: 1.6 {RATIO} (ref 1.2–2.2)
ALP SERPL-CCNC: 124 IU/L (ref 39–117)
ALT SERPL-CCNC: 13 IU/L (ref 0–32)
AST SERPL-CCNC: 15 IU/L (ref 0–40)
BASOPHILS # BLD AUTO: 0 X10E3/UL (ref 0–0.2)
BASOPHILS NFR BLD AUTO: 0 %
BILIRUB SERPL-MCNC: 0.6 MG/DL (ref 0–1.2)
BUN SERPL-MCNC: 8 MG/DL (ref 8–27)
BUN/CREAT SERPL: 12 (ref 12–28)
CALCIUM SERPL-MCNC: 8.7 MG/DL (ref 8.7–10.3)
CHLORIDE SERPL-SCNC: 97 MMOL/L (ref 96–106)
CHOLEST SERPL-MCNC: 175 MG/DL (ref 100–199)
CO2 SERPL-SCNC: 29 MMOL/L (ref 18–29)
CREAT SERPL-MCNC: 0.66 MG/DL (ref 0.57–1)
EOSINOPHIL # BLD AUTO: 0.4 X10E3/UL (ref 0–0.4)
EOSINOPHIL NFR BLD AUTO: 4 %
ERYTHROCYTE [DISTWIDTH] IN BLOOD BY AUTOMATED COUNT: 16.4 % (ref 12.3–15.4)
GLOBULIN SER CALC-MCNC: 2.6 G/DL (ref 1.5–4.5)
GLUCOSE SERPL-MCNC: 91 MG/DL (ref 65–99)
HCT VFR BLD AUTO: 33.7 % (ref 34–46.6)
HDLC SERPL-MCNC: 25 MG/DL
HGB BLD-MCNC: 10.7 G/DL (ref 11.1–15.9)
IMM GRANULOCYTES # BLD: 0.1 X10E3/UL (ref 0–0.1)
IMM GRANULOCYTES NFR BLD: 1 %
INTERPRETATION, 910389: NORMAL
LDLC SERPL CALC-MCNC: 107 MG/DL (ref 0–99)
LYMPHOCYTES # BLD AUTO: 0.9 X10E3/UL (ref 0.7–3.1)
LYMPHOCYTES NFR BLD AUTO: 9 %
MCH RBC QN AUTO: 25.6 PG (ref 26.6–33)
MCHC RBC AUTO-ENTMCNC: 31.8 G/DL (ref 31.5–35.7)
MCV RBC AUTO: 81 FL (ref 79–97)
MONOCYTES # BLD AUTO: 0.9 X10E3/UL (ref 0.1–0.9)
MONOCYTES NFR BLD AUTO: 9 %
NEUTROPHILS # BLD AUTO: 7.8 X10E3/UL (ref 1.4–7)
NEUTROPHILS NFR BLD AUTO: 77 %
PLATELET # BLD AUTO: 245 X10E3/UL (ref 150–379)
POTASSIUM SERPL-SCNC: 3.8 MMOL/L (ref 3.5–5.2)
PROT SERPL-MCNC: 6.7 G/DL (ref 6–8.5)
RBC # BLD AUTO: 4.18 X10E6/UL (ref 3.77–5.28)
SODIUM SERPL-SCNC: 140 MMOL/L (ref 134–144)
TRIGL SERPL-MCNC: 217 MG/DL (ref 0–149)
TSH SERPL DL<=0.005 MIU/L-ACNC: 1.94 UIU/ML (ref 0.45–4.5)
VLDLC SERPL CALC-MCNC: 43 MG/DL (ref 5–40)
WBC # BLD AUTO: 10.1 X10E3/UL (ref 3.4–10.8)

## 2017-07-11 NOTE — PROGRESS NOTES
pls let patient know labs stable-continue with same meds  Vit D is improved, cholesterol ok  Mild anemia-take MVI daily

## 2018-03-01 ENCOUNTER — OFFICE VISIT (OUTPATIENT)
Dept: FAMILY MEDICINE CLINIC | Age: 68
End: 2018-03-01

## 2018-03-01 ENCOUNTER — TELEPHONE (OUTPATIENT)
Dept: FAMILY MEDICINE CLINIC | Age: 68
End: 2018-03-01

## 2018-03-01 VITALS
DIASTOLIC BLOOD PRESSURE: 49 MMHG | OXYGEN SATURATION: 97 % | RESPIRATION RATE: 17 BRPM | HEART RATE: 64 BPM | TEMPERATURE: 96.4 F | SYSTOLIC BLOOD PRESSURE: 122 MMHG | WEIGHT: 99 LBS | BODY MASS INDEX: 18.69 KG/M2 | HEIGHT: 61 IN

## 2018-03-01 DIAGNOSIS — I48.20 CHRONIC ATRIAL FIBRILLATION (HCC): ICD-10-CM

## 2018-03-01 DIAGNOSIS — M81.0 AGE-RELATED OSTEOPOROSIS WITHOUT CURRENT PATHOLOGICAL FRACTURE: ICD-10-CM

## 2018-03-01 DIAGNOSIS — I10 ESSENTIAL HYPERTENSION: ICD-10-CM

## 2018-03-01 DIAGNOSIS — Z23 ENCOUNTER FOR IMMUNIZATION: ICD-10-CM

## 2018-03-01 DIAGNOSIS — Z00.00 MEDICARE ANNUAL WELLNESS VISIT, SUBSEQUENT: Primary | ICD-10-CM

## 2018-03-01 DIAGNOSIS — Q85.00 NEUROFIBROMATOSIS (HCC): ICD-10-CM

## 2018-03-01 DIAGNOSIS — E03.4 HYPOTHYROIDISM DUE TO ACQUIRED ATROPHY OF THYROID: ICD-10-CM

## 2018-03-01 DIAGNOSIS — E55.9 VITAMIN D DEFICIENCY: ICD-10-CM

## 2018-03-01 DIAGNOSIS — E27.8 ADRENAL NODULE (HCC): ICD-10-CM

## 2018-03-01 DIAGNOSIS — E78.00 HYPERCHOLESTEROLEMIA: ICD-10-CM

## 2018-03-01 DIAGNOSIS — Z71.89 ADVANCE DIRECTIVE DISCUSSED WITH PATIENT: ICD-10-CM

## 2018-03-01 NOTE — MR AVS SNAPSHOT
Ashley Mcnally [address] [place] [id number] 
[phone] Patient: Rachel Rivera MRN: [id number] AJU:[id number] Visit Information Date & Time Provider Department Dept. Phone Encounter #  
 3/1/2018  9:00 AM Gracia Garcia MD Mamie Aguilera [id number] Follow-up Instructions Return in about 6 months (around 9/1/2018) for follow up. Routing History Your Appointments 7/5/2018  9:00 AM  
Office Visit with Gracia Garcia MD  
[place] [address] ([address] [facility]) Appt Note: mwv; ;   
 [address] [place]  
  
   
 [address] Upcoming Health Maintenance Date Due Influenza Age 5 to Adult 8/1/2017 MEDICARE YEARLY EXAM 2/23/2018 BREAST CANCER SCRN MAMMOGRAM 3/21/2019 GLAUCOMA SCREENING Q2Y 5/1/2019 COLONOSCOPY 1/1/2020 DTaP/Tdap/Td series (2 - Td) 8/14/2026 Allergies as of 3/1/2018  Review Complete On: 3/1/2018 By: Gracia Garcia MD  
  
 Severity Noted Reaction Type Reactions Codeine  02/08/2012    Nausea Only Statins-hmg-coa Reductase Inhibitors  07/20/2016   Intolerance Other (comments) Leg pains, bone pains Current Immunizations  Reviewed on 3/1/2018 Name Date Influenza High Dose Vaccine PF  Incomplete, 10/1/2016, 1/20/2016 Influenza Vaccine 9/4/2014, 10/7/2010 Pneumococcal Conjugate (PCV-13) 1/15/2014 Pneumococcal Polysaccharide (PPSV-23) 7/5/2017  1:51 PM  
 Td 8/14/2016, 8/1/2012 Tdap 6/17/2015 12:55 PM  
  
 Reviewed by Gracia Garcia MD on 3/1/2018 at  9:20 AM  
You Were Diagnosed With   
  
 Codes Comments Medicare annual wellness visit, subsequent    -  Primary ICD-10-CM: Z00.00 ICD-9-CM: V70.0 Chronic atrial fibrillation (HCC)     ICD-10-CM: C61.7 ICD-9-CM: 427.31 Adrenal nodule ([facility] [address]     ICD-10-CM: E27.9 ICD-9-CM: 255.8 Hypothyroidism due to acquired atrophy of thyroid     ICD-10-CM: E03.4 ICD-9-CM: 244.8, 246.8 Vitamin D deficiency     ICD-10-CM: E55.9 ICD-9-CM: 268.9 Hypercholesterolemia     ICD-10-CM: E78.00 ICD-9-CM: 272.0 Advance directive discussed with patient     ICD-10-CM: Z71.89 ICD-9-CM: V65.49 Essential hypertension     ICD-10-CM: I10 
ICD-9-CM: 401.9 Neurofibromatosis ([facility] [address]     ICD-10-CM: Q85.00 ICD-9-CM: 237.70 Age-related osteoporosis without current pathological fracture     ICD-10-CM: M81.0 ICD-9-CM: 733.01 Encounter for immunization     ICD-10-CM: N37 ICD-9-CM: V03.89 Vitals BP Pulse Temp Resp Height(growth percentile) Weight(growth percentile) 122/49 64 96.4 °F (35.8 °C) (Oral) 17 5' 1\" (1.549 m) 99 lb (44.9 kg) SpO2 BMI OB Status Smoking Status 97% 18.71 kg/m2 Hysterectomy Never Smoker BMI and BSA Data Body Mass Index Body Surface Area 18.71 kg/m 2 1.39 m 2 Preferred Pharmacy Pharmacy Name Phone [place] [phone] [address] [phone] Your Updated Medication List  
  
   
This list is accurate as of 3/1/18  9:47 AM.  Always use your most recent med list.  
  
  
  
  
 cholecalciferol (VITAMIN D3) 5,000 unit Tab tablet Commonly known as:  VITAMIN D3 Take  by mouth daily. digoxin 0.25 mg tablet Commonly known as:  LANOXIN Take  by mouth daily. furosemide 40 mg tablet Commonly known as:  LASIX Take 1 Tab by mouth daily. levothyroxine 50 mcg tablet Commonly known as:  SYNTHROID  
TAKE 1 TABLET DAILY BEFORE BREAKFAST  
  
 metoprolol tartrate 50 mg tablet Commonly known as:  LOPRESSOR Take 1 Tab by mouth daily. potassium chloride SR 10 mEq tablet Commonly known as:  KLOR-CON 10 Take 1 Tab by mouth daily. warfarin 2 mg tablet Commonly known as:  COUMADIN Take 2 mg by mouth daily. We Performed the Following ADMIN INFLUENZA VIRUS VAC [ [facility]] INFLUENZA VIRUS VACCINE, HIGH DOSE SEASONAL, PRESERVATIVE FREE [48752 CPT(R)] Follow-up Instructions Return in about 6 months (around 9/1/2018) for follow up. To-Do List   
 03/01/2018 Lab:  CBC WITH AUTOMATED DIFF   
  
 03/01/2018 Lab:  LIPID PANEL   
  
 03/01/2018 Lab:  METABOLIC PANEL, COMPREHENSIVE   
  
 03/01/2018 Lab:  TSH 3RD GENERATION   
  
 03/01/2018 Lab:  VITAMIN D, 25 HYDROXY Patient Instructions Medicare Wellness Visit, Female The best way to live healthy is to have a healthy lifestyle by eating a well-balanced diet, exercising regularly, limiting alcohol and stopping smoking. Regular physical exams and screening tests are another way to keep healthy. Preventive exams provided by your health care provider can find health problems before they become diseases or illnesses. Preventive services including immunizations, screening tests, monitoring and exams can help you take care of your own health. All people over age 72 should have a pneumovax  and and a prevnar shot to prevent pneumonia. These are once in a lifetime unless you and your provider decide differently. All people over 65 should have a yearly flu shot and a tetanus vaccine every 10 years. A bone mass density to screen for osteoporosis or thinning of the bones should be done every 2 years after 65. Screening for diabetes mellitus with a blood sugar test should be done every year. Glaucoma is a disease of the eye due to increased ocular pressure that can lead to blindness and it should be done every year by an eye professional. 
 
Cardiovascular screening tests that check for elevated lipids (fatty part of blood) which can lead to heart disease and strokes should be done every 5 years.  
 
Colorectal screening that evaluates for blood or polyps in your colon should be done yearly as a stool test or every five years as a flexible sigmoidoscope or every 10 years as a colonoscopy up to age 76. Breast cancer screening with a mammogram is recommended biennially  for women age 54-69. Screening for cervical cancer with a pap smear and pelvic exam is recommended for women after age 72 years every 2 years up to age 79 or when the provider and patient decide to stop. If there is a history of cervical abnormalities or other increased risk for cancer then the test is recommended yearly. Hepatitis C screening is also recommended for anyone born between 80 through [address]. A shingles vaccine is also recommended once in a lifetime after age 61. Your Medicare Wellness Exam is recommended annually. Here is a list of your current Health Maintenance items with a due date: 
Health Maintenance Due Topic Date Due  
 Flu Vaccine  08/01/2017 [facility] Annual Well Visit  02/23/2018 Medicare Wellness Visit, Female The best way to live healthy is to have a healthy lifestyle by eating a well-balanced diet, exercising regularly, limiting alcohol and stopping smoking. Regular physical exams and screening tests are another way to keep healthy. Preventive exams provided by your health care provider can find health problems before they become diseases or illnesses. Preventive services including immunizations, screening tests, monitoring and exams can help you take care of your own health. All people over age 72 should have a pneumovax  and and a prevnar shot to prevent pneumonia. These are once in a lifetime unless you and your provider decide differently. All people over 65 should have a yearly flu shot and a tetanus vaccine every 10 years. A bone mass density to screen for osteoporosis or thinning of the bones should be done every 2 years after 65. Screening for diabetes mellitus with a blood sugar test should be done every year. Glaucoma is a disease of the eye due to increased ocular pressure that can lead to blindness and it should be done every year by an eye professional. 
 
Cardiovascular screening tests that check for elevated lipids (fatty part of blood) which can lead to heart disease and strokes should be done every 5 years. Colorectal screening that evaluates for blood or polyps in your colon should be done yearly as a stool test or every five years as a flexible sigmoidoscope or every 10 years as a colonoscopy up to age 76. Breast cancer screening with a mammogram is recommended biennially  for women age 54-69. Screening for cervical cancer with a pap smear and pelvic exam is recommended for women after age 72 years every 2 years up to age 79 or when the provider and patient decide to stop. If there is a history of cervical abnormalities or other increased risk for cancer then the test is recommended yearly. Hepatitis C screening is also recommended for anyone born between [birth year] [address]. A shingles vaccine is also recommended once in a lifetime after age 61. Your Medicare Wellness Exam is recommended annually. Here is a list of your current Health Maintenance items with a due date: 
Health Maintenance Due Topic Date Due  
 Flu Vaccine  08/01/2017 [facility] Annual Well Visit  02/23/2018 Preventing Osteoporosis: Care Instructions Your Care Instructions Osteoporosis means the bones are weak and thin enough that they can break easily. The older you are, the more likely you are to get osteoporosis. But with plenty of calcium, vitamin D, and exercise, you can help prevent osteoporosis. The preteen and teen years are a key time for bone building. With the help of calcium, vitamin D, and exercise in those early years and beyond, the bones reach their peak density and strength by age 27. After age 27, your bones naturally start to thin and weaken. The stronger your bones are at around age 27, the lower your risk for osteoporosis. But no matter what your age and risk are, your bones still need calcium, vitamin D, and exercise to stay strong. Also avoid smoking, and limit alcohol. Smoking and heavy alcohol use can make your bones thinner. Talk to your doctor about any special risks you might have, such as having a close relative with osteoporosis or taking a medicine that can weaken bones. Your doctor can tell you the best ways to protect your bones from thinning. Follow-up care is a key part of your treatment and safety. Be sure to make and go to all appointments, and call your doctor if you are having problems. It's also a good idea to know your test results and keep a list of the medicines you take. How can you care for yourself at home? · Get enough calcium and vitamin D. The [place] of Medicine recommends adults younger than age 46 need 1,000 mg of calcium and 600 IU of vitamin D each day. Women ages 46 to 79 need 1,200 mg of calcium and 600 IU of vitamin D each day. Men ages 46 to 79 need 1,000 mg of calcium and 600 IU of vitamin D each day. Adults 71 and older need 1,200 mg of calcium and 800 IU of vitamin D each day. ¨ Eat foods rich in calcium, like yogurt, cheese, milk, and dark green vegetables. ¨ Eat foods rich in vitamin D, like eggs, fatty fish, cereal, and fortified milk. ¨ Get some sunshine. Your body uses sunshine to make its own vitamin D. The safest time to be out in the sun is before 10 a.m. or after 3 p.m. Avoid getting sunburned. Sunburn can increase your risk of skin cancer. ¨ Talk to your doctor about taking a calcium plus vitamin D supplement. Ask about what type of calcium is right for you, and how much to take at a time. Adults ages 23 to 48 should not get more than 2,500 mg of calcium and 4,000 IU of vitamin D each day, whether it is from supplements and/or food.  Adults ages 46 and older should not get more than 2,000 mg of calcium and 4,000 IU of vitamin D each day from supplements and/or food. · Get regular bone-building exercise. Weight-bearing and resistance exercises keep bones healthy by working the muscles and bones against gravity. Start out at an exercise level that feels right for you. Add a little at a time until you can do the following: ¨ Do 30 minutes of weight-bearing exercise on most days of the week. Walking, jogging, stair climbing, and dancing are good choices. ¨ Do resistance exercises with weights or elastic bands 2 to 3 days a week. · Limit alcohol. Drink no more than 1 alcohol drink a day if you are a woman. Drink no more than 2 alcohol drinks a day if you are a man. · Do not smoke. Smoking can make bones thin faster. If you need help quitting, talk to your doctor about stop-smoking programs and medicines. These can increase your chances of quitting for good. When should you call for help? Watch closely for changes in your health, and be sure to contact your doctor if you have any problems. Where can you learn more? Go to http://librado-alaina.info/. Enter W147 in the search box to learn more about \"Preventing Osteoporosis: Care Instructions. \" Current as of: May 12, 2017 Content Version: 11.4 © 3777-6670 Healthwise, Incorporated. Care instructions adapted under license by [employer] (which disclaims liability or warranty for this information). If you have questions about a medical condition or this instruction, always ask your healthcare professional. [place] [address] any warranty or liability for your use of this information. Introducing [facility]! Vincent Fields introduces Nextance patient portal. Now you can access parts of your medical record, email your doctor's office, and request medication refills online. 1. In your internet browser, go to [email or web address] 2. Click on the First Time User? Click Here link in the Sign In box. You will see the New Member Sign Up page. 3. Enter your [employer] Access Code exactly as it appears below. You will not need to use this code after youve completed the sign-up process. If you do not sign up before the expiration date, you must request a new code. · Z[employer] Access Code: [email or web address] Expires: 5/30/2018  9:47 AM 
 
4. Enter the last four digits of your Social Security Number (xxxx) and Date of Birth (mm/dd/yyyy) as indicated and click Submit. You will be taken to the next sign-up page. 5. Create a Zorap ID. This will be your Zorap login ID and cannot be changed, so think of one that is secure and easy to remember. 6. Create a Zorap password. You can change your password at any time. 7. Enter your Password Reset Question and Answer. This can be used at a later time if you forget your password. 8. Enter your e-mail address. You will receive e-mail notification when new information is available in [address] 9. Click Sign Up. You can now view and download portions of your medical record. 10. Click the Download Summary menu link to download a portable copy of your medical information. If you have questions, please visit the Frequently Asked Questions section of the [employer] website. Remember, [employer] is NOT to be used for urgent needs. For medical emergencies, dial 911. Now available from your iPhone and Android! Please provide this summary of care documentation to your next provider. Your primary care clinician is listed as Chapis Johnson. If you have any questions after today's visit, please call [phone].

## 2018-03-01 NOTE — TELEPHONE ENCOUNTER
Pt. Says shingle shot you wanted them to get has have a request sent to the ins. Co. That is what pt. Said.

## 2018-03-01 NOTE — PROGRESS NOTES
Chief Complaint   Patient presents with    Follow Up Chronic Condition     Patient is fasting    Annual Wellness Visit    Immunization/Injection     Influenza       Pt is a 77y.o. year old female who presents for follow up of her chronic medical problems    Health Maintenance Due   Topic Date Due    Influenza Age 5 to Adult  08/01/2017-today    MEDICARE YEARLY EXAM  02/23/2018-today     CT ABD/PELVIS-IV ONLY12/21/2017-sees at ER then for constipation  [facility]  Result Impression   IMPRESSION:  1. In the right mid kidney, there is a small cyst and an ill-defined area of heterogeneous hypoenhancement. Consider pyelonephritis. Correlate clinically and recommend renal protocol CT or MRI to further characterize if there is no evidence of infection, otherwise recommend followup imaging after treatment. 2. Indeterminate left adrenal gland, likely benign given stability.  -Postsurgical changes of cholecystectomy, hysterectomy, and small bowel anastomosis noted. 3. Spleen is mildly enlarged similar to prior. Possible hepatic steatosis. 4. Cutaneous nodules in keeping with neurofibromatosis. Multiple areas of slightly nodular enhancement in the small bowel, seen on prior imaging also, may also be related to neurofibromatosis. Fasting?  Yes  Not on statin-intolerant to these  Lab Results   Component Value Date/Time    Cholesterol, total 244 (H) 03/01/2018 09:59 AM    HDL Cholesterol 26 (L) 03/01/2018 09:59 AM    LDL,Direct 143 (H) 08/26/2013 09:16 AM    LDL, calculated 162 (H) 03/01/2018 09:59 AM    VLDL, calculated 56 (H) 03/01/2018 09:59 AM    Triglyceride 279 (H) 03/01/2018 09:59 AM    CHOL/HDL Ratio 7.5 (H) 01/20/2016 09:50 AM       BP Readings from Last 3 Encounters:   03/01/18 122/49   07/05/17 122/54   02/22/17 116/55       Wt Readings from Last 3 Encounters:   03/01/18 99 lb (44.9 kg)   07/05/17 102 lb 9.6 oz (46.5 kg)   02/22/17 101 lb (45.8 kg)       Lab Results   Component Value Date/Time Vitamin D 25-Hydroxy 7.8 (L) 01/20/2016 09:50 AM    VITAMIN D, 25-HYDROXY 6.9 (L) 03/01/2018 09:59 AM     On OTC Vit D; ins will not pay for Vit D rx    Lab Results   Component Value Date/Time    TSH 1.810 03/01/2018 09:59 AM   On Synthroid    tx for osteoporosis?never  Interested in getting Prolia; declines to take another pill    Afib-on Dig since 2005 but now has to pay co-pay so she may no longer take it and Coumadin  Will ask heart doc if she could discontinue it    ROS:    Pt denies: Wt loss, Fever/Chills, HA, Visual changes, Fatigue, Chest pain, SOB, GONZALES, Abd pain, N/V/D/C, Blood in stool or urine, Edema. Pertinent positive as above in HPI. All others were negative    Patient Active Problem List   Diagnosis Code    Hypercholesterolemia E78.00    Hypertension I10    Vitamin D deficiency E55.9    Incidental lung nodule, less than or equal to 3mm R91.1    Adrenal nodule ([facility] E27.9    Hypothyroidism due to acquired atrophy of thyroid E03.4    Renal cyst N28.1    Advance directive discussed with patient Z71.89    Chronic atrial fibrillation (HCC) I48.2    Neurofibromatosis ([facility] Q85.00    Age-related osteoporosis without current pathological fracture M81.0       Past Medical History:   Diagnosis Date    Arrhythmia     atrial fibrillation/has mitral valve disease from hx of rheumatic fever    Contact dermatitis and other eczema, due to unspecified cause     Headache(784.0)     hx migraines    Hypercholesterolemia     Hypertension     Neurofibromatosis, unspecified     Renal mass 11/6/2012    Thyroid disease        Current Outpatient Prescriptions   Medication Sig Dispense Refill    levothyroxine (SYNTHROID) 50 mcg tablet TAKE 1 TABLET DAILY BEFORE BREAKFAST 90 Tab 3    metoprolol (LOPRESSOR) 50 mg tablet Take 1 Tab by mouth daily. 90 Tab 3    furosemide (LASIX) 40 mg tablet Take 1 Tab by mouth daily. 90 Tab 3    potassium chloride SR (KLOR-CON 10) 10 mEq tablet Take 1 Tab by mouth daily.  80 Tab 3    warfarin (COUMADIN) 2 mg tablet Take 2 mg by mouth daily.  digoxin (LANOXIN) 0.25 mg tablet Take  by mouth daily.            History   Smoking Status    Never Smoker   Smokeless Tobacco    Never Used       Allergies   Allergen Reactions    Codeine Nausea Only    Statins-Hmg-Coa Reductase Inhibitors Other (comments)     Leg pains, bone pains       Patient Labs were reviewed: yes      Patient Past Records were reviewed:  yes        Objective:     Vitals:    03/01/18 0914   BP: 122/49   Pulse: 64   Resp: 17   Temp: 96.4 °F (35.8 °C)   TempSrc: Oral   SpO2: 97%   Weight: 99 lb (44.9 kg)   Height: 5' 1\" (1.549 m)     Body mass index is 18.71 kg/(m^2). Exam:   Appearance: alert, well appearing,  oriented to person, place, and time, acyanotic, in no respiratory distress and well hydrated. HEENT:  NC/AT, pink conj, anicteric sclerae  Neck:  No cervical lymphadenopathy, no JVD, no thyromegaly, no carotid bruit  Heart: irregularly irregular rhythm  Lungs:  CTAB, no rhonchi, rales, or wheezes with good air exchange   Abdomen:  Non-tender, pos bowel sounds, no hepatosplenomegaly  Ext:  No C/C/E    Skin: no rash  Neuro: no lateralizing signs, CNs II-XII intact  Back: no tenderness along the spine    Assessment/ Plan:   Diagnoses and all orders for this visit:    1. Medicare annual wellness visit, subsequent-see note below    2. Chronic atrial fibrillation (HCC)-on Coumadin/home monitoring per Cardio; continue with Lopressor; she will ask Cardio about stopping the Dig bec of cost    3. Adrenal nodule (HCC)-stable on Ct    4. Hypothyroidism due to acquired atrophy of thyroid-continue with Synthroid  -     TSH 3RD GENERATION; Future    5. Vitamin D deficiency-on OTC Vit D; advised sun exposure, Vit D rich foods  -     VITAMIN D, 25 HYDROXY; Future    6. Hypercholesterolemia-low cholesterol diet as she is intolerant to statins  -     LIPID PANEL; Future    7.  Essential hypertension-controlled, continue with present meds  -     CBC WITH AUTOMATED DIFF; Future  -     METABOLIC PANEL, COMPREHENSIVE; Future    8. Neurofibromatosis (HCC)-asymptomatic    9. Age-related osteoporosis without current pathological fracture-will get prior auth for Prolia; advised Ca+d daily, walk for exercise            Follow-up Disposition:  Return in about 6 months (around 9/1/2018) for follow up. I have discussed the diagnosis with the patient and the intended plan as seen in the above orders. The patient has received an After-Visit Summary and questions were answered concerning future plans. Medication Side Effects and Warnings were discussed with patient: yes    Patient verbalized understanding of above instructions. Sommer Reveles MD  Internal Medicine  [facility]      This is a Subsequent Medicare Annual Wellness Exam (AWV) (Performed 12 months after IPPE or effective date of Medicare Part B enrollment)    I have reviewed the patient's medical history in detail and updated the computerized patient record. MAMMO-DIGITAL SCREENING BILAT W/ TOMOSYNTHESIS3/21/2017-declines to do it again  [facility]  Result Impression   Impression:    No evidence of malignancy, suggest routine follow-up. BONE DENSITY STUDY - CENTRAL2/10/2016  [facility]  Result Impression   Impression:    BMD MEASURES  CONSISTENT WITH OSTEOPOROSIS.      Immunizations: flu today, advised re availability of new shingles vaccine    History     Past Medical History:   Diagnosis Date    Arrhythmia     atrial fibrillation/has mitral valve disease from hx of rheumatic fever    Contact dermatitis and other eczema, due to unspecified cause     Headache(784.0)     hx migraines    Hypercholesterolemia     Hypertension     Neurofibromatosis, unspecified     Renal mass 11/6/2012    Thyroid disease       Past Surgical History:   Procedure Laterality Date    ENDOSCOPY, COLON, DIAGNOSTIC      colon surgery lg & sm intestine    HX CHOLECYSTECTOMY      HX GI      for neurofibromatosis    HX GYN      hysterectomy for bleeding     Current Outpatient Prescriptions   Medication Sig Dispense Refill    levothyroxine (SYNTHROID) 50 mcg tablet TAKE 1 TABLET DAILY BEFORE BREAKFAST 90 Tab 3    metoprolol (LOPRESSOR) 50 mg tablet Take 1 Tab by mouth daily. 90 Tab 3    furosemide (LASIX) 40 mg tablet Take 1 Tab by mouth daily. 90 Tab 3    potassium chloride SR (KLOR-CON 10) 10 mEq tablet Take 1 Tab by mouth daily. 90 Tab 3    warfarin (COUMADIN) 2 mg tablet Take 2 mg by mouth daily.  digoxin (LANOXIN) 0.25 mg tablet Take  by mouth daily.            1     Allergies   Allergen Reactions    Codeine Nausea Only    Statins-Hmg-Coa Reductase Inhibitors Other (comments)     Leg pains, bone pains     Family History   Problem Relation Age of Onset    Hypertension Mother     Cancer Sister      APPENDIX    Cancer Brother      metastic cancer in remission     Cancer Sister      \"it hardened her arteries\"   Zakia Barrack Cancer Sister      colon     Cancer Sister     Cancer Sister      Social History   Substance Use Topics    Smoking status: Never Smoker    Smokeless tobacco: Never Used    Alcohol use No     Patient Active Problem List   Diagnosis Code    Hypercholesterolemia E78.00    Hypertension I10    Vitamin D deficiency E55.9    Incidental lung nodule, less than or equal to 3mm R91.1    Adrenal nodule ([address] E27.9    Hypothyroidism due to acquired atrophy of thyroid E03.4    Renal cyst N28.1    Advance directive discussed with patient Z71.89    Chronic atrial fibrillation ([address] I48.2    Neurofibromatosis ([address] Q85.00    Age-related osteoporosis without current pathological fracture M81.0       Depression Risk Factor Screening:     PHQ over the last two weeks 3/1/2018   Little interest or pleasure in doing things Not at all   Feeling down, depressed or hopeless Not at all   Total Score PHQ 2 0     Alcohol Risk Factor Screening: You do not drink alcohol or very rarely. Functional Ability and Level of Safety:   Hearing Loss  Hearing is good. Activities of Daily Living  The home contains: no safety equipment. Patient does total self care    Fall Risk  Fall Risk Assessment, last 12 mths 3/1/2018   Able to walk? Yes   Fall in past 12 months? No   Fall with injury? -   Number of falls in past 12 months -   Fall Risk Score -       Abuse Screen  Patient is not abused    Cognitive Screening   Evaluation of Cognitive Function:  Has your family/caregiver stated any concerns about your memory: no  Normal    Patient Care Team   Patient Care Team:  Arielle Morris MD as PCP - General (Internal Medicine)  Yana Crain MD (Cardiology)    Assessment/Plan   Education and counseling provided:  Are appropriate based on today's review and evaluation  End-of-Life planning (with patient's consent) -see ACP note  Pneumococcal Vaccine-done  Influenza Vaccine-today  Screening Mammography-declined  Colorectal cancer screening tests-up to date  Cardiovascular screening blood test-fasting lipids today  Bone mass measurement (DEXA)-will repeat after tx  Screening for glaucoma-wears eyeglasses, up to date  Diabetes screening test-FBS today    Diagnoses and all orders for this visit:    1. Medicare annual wellness visit, subsequent-Refer to above for plan and to patient instructions for recommendations on HM    2. Advance directive discussed with patient    3. Encounter for immunization  -     Influenza Admin (Rolanda Dyer)  -     Influenza virus vaccine (FLUZONE HIGH DOSE) PF ([id number])      There are no preventive care reminders to display for this patient.      RTC yearly for wellness visit

## 2018-03-01 NOTE — PROGRESS NOTES
Chief Complaint   Patient presents with    Follow Up Chronic Condition     Patient is fasting    Annual Wellness Visit    Immunization/Injection     Influenza       1. Have you been to the ER, urgent care clinic since your last visit? Hospitalized since your last visit? No    2. Have you seen or consulted any other health care providers outside of the [address] [place] [address] since your last visit? Include any pap smears or colon screening.  No

## 2018-03-01 NOTE — ACP (ADVANCE CARE PLANNING)
Advance Care Planning (ACP) Provider Conversation Snapshot    Date of ACP Conversation: 03/01/18  Persons included in Conversation:  patient  Length of ACP Conversation in minutes:  <16 minutes (Non-Billable)    Authorized Decision Maker (if patient is incapable of making informed decisions):    This person is:   her           For Patients with Decision Making Capacity:   Values/Goals: Exploration of values, goals, and preferences if recovery is not expected, even with continued medical treatment in the event of:  Imminent death  Severe, permanent brain injury    Conversation Outcomes / Follow-Up Plan:   Recommended completion of Advance Directive form after review of ACP materials and conversation with prospective healthcare agent

## 2018-03-01 NOTE — PATIENT INSTRUCTIONS
Medicare Wellness Visit, Female    The best way to live healthy is to have a healthy lifestyle by eating a well-balanced diet, exercising regularly, limiting alcohol and stopping smoking. Regular physical exams and screening tests are another way to keep healthy. Preventive exams provided by your health care provider can find health problems before they become diseases or illnesses. Preventive services including immunizations, screening tests, monitoring and exams can help you take care of your own health. All people over age 72 should have a pneumovax  and and a prevnar shot to prevent pneumonia. These are once in a lifetime unless you and your provider decide differently. All people over 65 should have a yearly flu shot and a tetanus vaccine every 10 years. A bone mass density to screen for osteoporosis or thinning of the bones should be done every 2 years after 65. Screening for diabetes mellitus with a blood sugar test should be done every year. Glaucoma is a disease of the eye due to increased ocular pressure that can lead to blindness and it should be done every year by an eye professional.    Cardiovascular screening tests that check for elevated lipids (fatty part of blood) which can lead to heart disease and strokes should be done every 5 years. Colorectal screening that evaluates for blood or polyps in your colon should be done yearly as a stool test or every five years as a flexible sigmoidoscope or every 10 years as a colonoscopy up to age 76. Breast cancer screening with a mammogram is recommended biennially  for women age 54-69. Screening for cervical cancer with a pap smear and pelvic exam is recommended for women after age 72 years every 2 years up to age 79 or when the provider and patient decide to stop. If there is a history of cervical abnormalities or other increased risk for cancer then the test is recommended yearly.     Hepatitis C screening is also recommended for anyone born between [birth year] through [address]. A shingles vaccine is also recommended once in a lifetime after age 61. Your Medicare Wellness Exam is recommended annually. Here is a list of your current Health Maintenance items with a due date:  Health Maintenance Due   Topic Date Due    Flu Vaccine  08/01/2017    Annual Well Visit  02/23/2018         Medicare Wellness Visit, Female    The best way to live healthy is to have a healthy lifestyle by eating a well-balanced diet, exercising regularly, limiting alcohol and stopping smoking. Regular physical exams and screening tests are another way to keep healthy. Preventive exams provided by your health care provider can find health problems before they become diseases or illnesses. Preventive services including immunizations, screening tests, monitoring and exams can help you take care of your own health. All people over age 72 should have a pneumovax  and and a prevnar shot to prevent pneumonia. These are once in a lifetime unless you and your provider decide differently. All people over 65 should have a yearly flu shot and a tetanus vaccine every 10 years. A bone mass density to screen for osteoporosis or thinning of the bones should be done every 2 years after 65. Screening for diabetes mellitus with a blood sugar test should be done every year. Glaucoma is a disease of the eye due to increased ocular pressure that can lead to blindness and it should be done every year by an eye professional.    Cardiovascular screening tests that check for elevated lipids (fatty part of blood) which can lead to heart disease and strokes should be done every 5 years. Colorectal screening that evaluates for blood or polyps in your colon should be done yearly as a stool test or every five years as a flexible sigmoidoscope or every 10 years as a colonoscopy up to age 76.     Breast cancer screening with a mammogram is recommended biennially  for women age 54-69.    Screening for cervical cancer with a pap smear and pelvic exam is recommended for women after age 72 years every 2 years up to age 79 or when the provider and patient decide to stop. If there is a history of cervical abnormalities or other increased risk for cancer then the test is recommended yearly. Hepatitis C screening is also recommended for anyone born between [birth year] through [address]. A shingles vaccine is also recommended once in a lifetime after age 61. Your Medicare Wellness Exam is recommended annually. Here is a list of your current Health Maintenance items with a due date:  Health Maintenance Due   Topic Date Due    Flu Vaccine  08/01/2017    Annual Well Visit  02/23/2018          Preventing Osteoporosis: Care Instructions  Your Care Instructions    Osteoporosis means the bones are weak and thin enough that they can break easily. The older you are, the more likely you are to get osteoporosis. But with plenty of calcium, vitamin D, and exercise, you can help prevent osteoporosis. The preteen and teen years are a key time for bone building. With the help of calcium, vitamin D, and exercise in those early years and beyond, the bones reach their peak density and strength by age [address] [place]. After age [address] [address] [place], your bones naturally start to thin and weaken. The stronger your bones are at around age [address] [place], the lower your risk for osteoporosis. But no matter what your age and risk are, your bones still need calcium, vitamin D, and exercise to stay strong. Also avoid smoking, and limit alcohol. Smoking and heavy alcohol use can make your bones thinner. Talk to your doctor about any special risks you might have, such as having a close relative with osteoporosis or taking a medicine that can weaken bones. Your doctor can tell you the best ways to protect your bones from thinning. Follow-up care is a key part of your treatment and safety.  Be sure to make and go to all appointments, and call your doctor if you are having problems. It's also a good idea to know your test results and keep a list of the medicines you take. How can you care for yourself at home? · Get enough calcium and vitamin D. The [place] [employer] recommends adults younger than age 46 need 1,000 mg of calcium and 600 IU of vitamin D each day. Women ages 46 to 79 need 1,200 mg of calcium and 600 IU of vitamin D each day. Men ages 46 to 79 need 1,000 mg of calcium and 600 IU of vitamin D each day. Adults 71 and older need 1,200 mg of calcium and 800 IU of vitamin D each day. ¨ Eat foods rich in calcium, like yogurt, cheese, milk, and dark green vegetables. ¨ Eat foods rich in vitamin D, like eggs, fatty fish, cereal, and fortified milk. ¨ Get some sunshine. Your body uses sunshine to make its own vitamin D. The safest time to be out in the sun is before 10 a.m. or after 3 p.m. Avoid getting sunburned. Sunburn can increase your risk of skin cancer. ¨ Talk to your doctor about taking a calcium plus vitamin D supplement. Ask about what type of calcium is right for you, and how much to take at a time. Adults ages 23 to 48 should not get more than 2,500 mg of calcium and 4,000 IU of vitamin D each day, whether it is from supplements and/or food. Adults ages 46 and older should not get more than 2,000 mg of calcium and 4,000 IU of vitamin D each day from supplements and/or food. · Get regular bone-building exercise. Weight-bearing and resistance exercises keep bones healthy by working the muscles and bones against gravity. Start out at an exercise level that feels right for you. Add a little at a time until you can do the following:  ¨ Do 30 minutes of weight-bearing exercise on most days of the week. Walking, jogging, stair climbing, and dancing are good choices. ¨ Do resistance exercises with weights or elastic bands 2 to 3 days a week. · Limit alcohol. Drink no more than 1 alcohol drink a day if you are a woman.  Drink no more than 2 alcohol drinks a day if you are a man. · Do not smoke. Smoking can make bones thin faster. If you need help quitting, talk to your doctor about stop-smoking programs and medicines. These can increase your chances of quitting for good. When should you call for help? Watch closely for changes in your health, and be sure to contact your doctor if you have any problems. Where can you learn more? Go to http://librado-alaina.info/. Enter W421 in the search box to learn more about \"Preventing Osteoporosis: Care Instructions. \"  Current as of: May 12, 2017  Content Version: 11.4  © 9818-7864 [employer]. Care instructions adapted under license by [employer] (which disclaims liability or warranty for this information). If you have questions about a medical condition or this instruction, always ask your healthcare professional. [address] any warranty or liability for your use of this information.

## 2018-03-02 DIAGNOSIS — D72.829 LEUKOCYTOSIS, UNSPECIFIED TYPE: ICD-10-CM

## 2018-03-02 DIAGNOSIS — E55.9 VITAMIN D DEFICIENCY: Primary | ICD-10-CM

## 2018-03-02 LAB
25(OH)D3+25(OH)D2 SERPL-MCNC: 6.9 NG/ML (ref 30–100)
ALBUMIN SERPL-MCNC: 4.5 G/DL (ref 3.5–4.8)
ALBUMIN/GLOB SERPL: 1.8 {RATIO} (ref 1.2–2.2)
ALP SERPL-CCNC: 140 IU/L (ref 39–117)
ALT SERPL-CCNC: 31 IU/L (ref 0–32)
AST SERPL-CCNC: 25 IU/L (ref 0–40)
BASOPHILS # BLD AUTO: 0 X10E3/UL (ref 0–0.2)
BASOPHILS NFR BLD AUTO: 0 %
BILIRUB SERPL-MCNC: 0.5 MG/DL (ref 0–1.2)
BUN SERPL-MCNC: 9 MG/DL (ref 8–27)
BUN/CREAT SERPL: 11 (ref 12–28)
CALCIUM SERPL-MCNC: 9.4 MG/DL (ref 8.7–10.3)
CHLORIDE SERPL-SCNC: 98 MMOL/L (ref 96–106)
CHOLEST SERPL-MCNC: 244 MG/DL (ref 100–199)
CO2 SERPL-SCNC: 29 MMOL/L (ref 18–29)
CREAT SERPL-MCNC: 0.8 MG/DL (ref 0.57–1)
EOSINOPHIL # BLD AUTO: 0.3 X10E3/UL (ref 0–0.4)
EOSINOPHIL NFR BLD AUTO: 2 %
ERYTHROCYTE [DISTWIDTH] IN BLOOD BY AUTOMATED COUNT: 16.5 % (ref 12.3–15.4)
GFR SERPLBLD CREATININE-BSD FMLA CKD-EPI: 75 ML/MIN/1.73
GFR SERPLBLD CREATININE-BSD FMLA CKD-EPI: 86 ML/MIN/1.73
GLOBULIN SER CALC-MCNC: 2.5 G/DL (ref 1.5–4.5)
GLUCOSE SERPL-MCNC: 105 MG/DL (ref 65–99)
HCT VFR BLD AUTO: 37.5 % (ref 34–46.6)
HDLC SERPL-MCNC: 26 MG/DL
HGB BLD-MCNC: 12.1 G/DL (ref 11.1–15.9)
IMM GRANULOCYTES # BLD: 0 X10E3/UL (ref 0–0.1)
IMM GRANULOCYTES NFR BLD: 0 %
INTERPRETATION, 910389: NORMAL
LDLC SERPL CALC-MCNC: 162 MG/DL (ref 0–99)
LYMPHOCYTES # BLD AUTO: 0.8 X10E3/UL (ref 0.7–3.1)
LYMPHOCYTES NFR BLD AUTO: 7 %
MCH RBC QN AUTO: 26.4 PG (ref 26.6–33)
MCHC RBC AUTO-ENTMCNC: 32.3 G/DL (ref 31.5–35.7)
MCV RBC AUTO: 82 FL (ref 79–97)
MONOCYTES # BLD AUTO: 0.9 X10E3/UL (ref 0.1–0.9)
MONOCYTES NFR BLD AUTO: 8 %
NEUTROPHILS # BLD AUTO: 10.1 X10E3/UL (ref 1.4–7)
NEUTROPHILS NFR BLD AUTO: 83 %
PLATELET # BLD AUTO: 262 X10E3/UL (ref 150–379)
POTASSIUM SERPL-SCNC: 4.6 MMOL/L (ref 3.5–5.2)
PROT SERPL-MCNC: 7 G/DL (ref 6–8.5)
RBC # BLD AUTO: 4.58 X10E6/UL (ref 3.77–5.28)
SODIUM SERPL-SCNC: 141 MMOL/L (ref 134–144)
TRIGL SERPL-MCNC: 279 MG/DL (ref 0–149)
TSH SERPL DL<=0.005 MIU/L-ACNC: 1.81 UIU/ML (ref 0.45–4.5)
VLDLC SERPL CALC-MCNC: 56 MG/DL (ref 5–40)
WBC # BLD AUTO: 12.1 X10E3/UL (ref 3.4–10.8)

## 2018-03-02 RX ORDER — ERGOCALCIFEROL 1.25 MG/1
50000 CAPSULE ORAL
Qty: 12 CAP | Refills: 1 | Status: SHIPPED | OUTPATIENT
Start: 2018-03-02 | End: 2018-07-05

## 2018-03-02 NOTE — PROGRESS NOTES
pls let patient know cholesterol went up by a lot-from 175 to 244; watch the fatty food in the diet  Thyroid test normal-continue with same dose of thyroid med  Vit D is lower-I will send rx just in case insurance covers it this year  WBC is slightly high, can she come back in 2 weeks to get this repeated

## 2018-03-06 ENCOUNTER — TELEPHONE (OUTPATIENT)
Dept: FAMILY MEDICINE CLINIC | Age: 68
End: 2018-03-06

## 2018-03-06 NOTE — TELEPHONE ENCOUNTER
----- Message from Angie Diggs MD sent at 3/2/2018  9:50 AM EST -----  pls let patient know cholesterol went up by a lot-from 175 to 244; watch the fatty food in the diet  Thyroid test normal-continue with same dose of thyroid med  Vit D is lower-I will send rx just in case insurance covers it this year  WBC is slightly high, can she come back in 2 weeks to get this repeated

## 2018-03-06 NOTE — TELEPHONE ENCOUNTER
Patient notified of lab results and recommendations. She did not want you to send the Vitamin D to pharmacy because her insurance does not pay for it. I will call to see if we can cancel it before they charge her for it.

## 2018-03-07 ENCOUNTER — TELEPHONE (OUTPATIENT)
Dept: FAMILY MEDICINE CLINIC | Age: 68
End: 2018-03-07

## 2018-03-07 NOTE — TELEPHONE ENCOUNTER
Spoke with patient about lab results in office. Informed her that Dr. Deven Sue wanted to recheck her WBC in 2 weeks. Lab appt scheduled with patient.

## 2018-03-07 NOTE — TELEPHONE ENCOUNTER
Pt. Wants a call from you regarding her blood work she her you say something was abnormal and wants to know what to do about it.   Please call

## 2018-03-22 LAB
BASOPHILS # BLD AUTO: 0 X10E3/UL (ref 0–0.2)
BASOPHILS NFR BLD AUTO: 0 %
EOSINOPHIL # BLD AUTO: 0.3 X10E3/UL (ref 0–0.4)
EOSINOPHIL NFR BLD AUTO: 5 %
ERYTHROCYTE [DISTWIDTH] IN BLOOD BY AUTOMATED COUNT: 16.1 % (ref 12.3–15.4)
HCT VFR BLD AUTO: 32.8 % (ref 34–46.6)
HGB BLD-MCNC: 10.7 G/DL (ref 11.1–15.9)
IMM GRANULOCYTES # BLD: 0 X10E3/UL (ref 0–0.1)
IMM GRANULOCYTES NFR BLD: 0 %
LYMPHOCYTES # BLD AUTO: 0.8 X10E3/UL (ref 0.7–3.1)
LYMPHOCYTES NFR BLD AUTO: 11 %
MCH RBC QN AUTO: 26.3 PG (ref 26.6–33)
MCHC RBC AUTO-ENTMCNC: 32.6 G/DL (ref 31.5–35.7)
MCV RBC AUTO: 81 FL (ref 79–97)
MONOCYTES # BLD AUTO: 0.8 X10E3/UL (ref 0.1–0.9)
MONOCYTES NFR BLD AUTO: 11 %
NEUTROPHILS # BLD AUTO: 5 X10E3/UL (ref 1.4–7)
NEUTROPHILS NFR BLD AUTO: 73 %
PLATELET # BLD AUTO: 210 X10E3/UL (ref 150–379)
RBC # BLD AUTO: 4.07 X10E6/UL (ref 3.77–5.28)
WBC # BLD AUTO: 6.9 X10E3/UL (ref 3.4–10.8)

## 2018-03-23 NOTE — PROGRESS NOTES
pls let patient know elevated WBC is now normal-that was the reason I needed her lab repeated to make sure the white blood cells go back to normal otherwise we may have to think of other things like leukemia

## 2018-05-17 ENCOUNTER — PATIENT OUTREACH (OUTPATIENT)
Dept: FAMILY MEDICINE CLINIC | Age: 68
End: 2018-05-17

## 2018-05-17 RX ORDER — LANOLIN ALCOHOL/MO/W.PET/CERES
325 CREAM (GRAM) TOPICAL
COMMUNITY
Start: 2018-05-17

## 2018-05-17 RX ORDER — METOPROLOL TARTRATE 100 MG/1
100 TABLET ORAL 2 TIMES DAILY
COMMUNITY
Start: 2018-05-16 | End: 2022-10-18

## 2018-05-17 RX ORDER — FUROSEMIDE 40 MG/1
60 TABLET ORAL DAILY
COMMUNITY
Start: 2018-05-17 | End: 2020-07-21 | Stop reason: SDUPTHER

## 2018-05-17 NOTE — PROGRESS NOTES
Heart Failure Follow Up Call    NN contacted the patient by telephone to perform CHF Follow Up. Verified  and address as identifiers. Vinayak Cardona reports feeling ok. States she has never had any swelling in her feet and legs. She was diagnosed with CHF back in  and again at this hospitalization. Both times her main symptom was SOB. In this pt's case, weight will probably not be good indicator of CHF. Daily Weight (document daily weights in flowsheets): Not weighing - weight has never changed and swelling never a problem   Amount:  n/a      Provider Notified:   n/a     Zone:(Pt Reported)  green     Goals      Initiate and reinforce education of the patient/family about management of disease and lifestyle changes. 18 Needs education on heart failure - zones and sx to watch for which may be indicators or worsening CHF. I will mail educational material to pt.  Understands red flags post discharge. 18 Aware to call for worsening sob , fever, chills            Needs addressed from pathway:    24-48 Hours- or initial contact   Review/Verification:  ? Identify care team  ? Disposition (Home, SNF, IRF LTC, [facility], Hospice)  ? DC Instructions, Education, and HF Zones  ? Larry Estrada in place. [facility], Dialysis center)   ? Evaluate DME needs; arrange home equipment  ? Advanced care planning (e.g.: Palliative Care; Hospice  ? Type of monitoring  ? Labs/Diagnostics to follow  ? Follow-up apts are arranged  ? Identify transportation    Med Rec*   ? Ace/Arb,   ? Diuretic,   ? Beta Blocker,   ? Potassium,   ? Anticoagulant   Baseline Labs*  ? BMP  ? BUN/Creat.  ? PT/INR  ? Hgb/Hct  ? WBC    BON Documentation:  ? Goals  ? Challenges/Plan  ? Weight    ? Edema  ? Symptoms    Education Disease Management:  ? Cardiac Low-sodium Diet (No added sodium; 1500mg as indicated). If Diabetic:   ?  include carb-controlled   ?  Fluid restriction (if indicated)  ? Comorbidity Management  ? Daily Weights  ? Advance medical directive status          PCP: Dr. Adama Osuna  Cardiologist: [facility]  Follow up appointment with PCP (within 2-3 days): Susan Jesus on 5/18/18  Follow up appointment with Cardiology (2-3 weeks) seeing cardiology next week 5/24/18    Cardiologist consult while IP: yes     Palliative consult while IP:    No     EF: 32% on 5/14/18   Type of HF:   HFrEF     Cardiac Device present: None      Heart Failure Medications: Betablocker, Diuretic, Potassium, Anticoagulat     Disposition of patient:  Home     HH Services/Tele Monitoring:  none     Social support: family    Do you have a Scale:    No   How often do you weigh:  not regularly  Does patient have an Advance Directive:  not on file  - need to address in office    Advance Directive scanned into patients chart:  No     Patient reminded that there are physicians on call 24 hours a day / 7 days a week (M-F 5pm to 8am and from Friday 5pm until Monday 8a for the weekend) should the patient have questions or concerns. Patient reminded to call 911 if situation is emergent or patient feels the situation is emergent. Pt verbalizes understanding.

## 2018-05-18 ENCOUNTER — OFFICE VISIT (OUTPATIENT)
Dept: FAMILY MEDICINE CLINIC | Age: 68
End: 2018-05-18

## 2018-05-18 VITALS
HEART RATE: 84 BPM | TEMPERATURE: 95.7 F | DIASTOLIC BLOOD PRESSURE: 59 MMHG | BODY MASS INDEX: 17.94 KG/M2 | SYSTOLIC BLOOD PRESSURE: 106 MMHG | RESPIRATION RATE: 17 BRPM | HEIGHT: 61 IN | WEIGHT: 95 LBS | OXYGEN SATURATION: 97 %

## 2018-05-18 DIAGNOSIS — I48.20 CHRONIC ATRIAL FIBRILLATION (HCC): ICD-10-CM

## 2018-05-18 DIAGNOSIS — I50.22 SYSTOLIC CHF, CHRONIC (HCC): Primary | ICD-10-CM

## 2018-05-18 DIAGNOSIS — J90 PLEURAL EFFUSION, RIGHT: ICD-10-CM

## 2018-05-18 DIAGNOSIS — Z09 HOSPITAL DISCHARGE FOLLOW-UP: ICD-10-CM

## 2018-05-18 DIAGNOSIS — Q85.00 NEUROFIBROMATOSIS (HCC): ICD-10-CM

## 2018-05-18 LAB
INR BLD: 1.8
PT POC: NORMAL SECONDS
VALID INTERNAL CONTROL?: YES

## 2018-05-18 NOTE — MR AVS SNAPSHOT
Ashley Landers [place] [address] [facility] [address] [address] 
[phone] Patient: Osiris Singh MRN: [id number] QMV:[birth year] Visit Information Date & Time Provider Department Dept. Phone Encounter #  
 5/18/2018 10:30 AM Akila Randall NP [facility] [id number] Follow-up Instructions Return in about 2 months (around 7/18/2018) for follow up, with Dr. Yocasta Travis. Your Appointments 7/5/2018  9:00 AM  
Office Visit with MD Liana Armas ([facility]) Appt Note: mwv; ;   
 [address] [facility] [address]  
  
   
 [address] [facility] [address]  
  
    
 9/4/2018  8:30 AM  
Follow Up with MD Liana Armas ([facility]) Appt Note: 6 mo f/u  
 [facility] [address]  
  
   
 [address]  
  
    
 3/4/2019  8:30 AM  
Office Visit with MD Liana Armas ([facility]) Appt Note: [address] [place] [address] [address] [facility] [address] [address]  
[phone] Upcoming Health Maintenance Date Due Influenza Age 5 to Adult 8/1/2018 MEDICARE YEARLY EXAM 3/2/2019 BREAST CANCER SCRN MAMMOGRAM 3/21/2019 GLAUCOMA SCREENING Q2Y 5/1/2019 COLONOSCOPY 1/1/2020 DTaP/Tdap/Td series (2 - Td) 8/14/2026 Allergies as of 5/18/2018  Review Complete On: 5/18/2018 By: Ebonie Lacey LPN Severity Noted Reaction Type Reactions Codeine  02/08/2012    Nausea Only Statins-hmg-coa Reductase Inhibitors  07/20/2016   Intolerance Other (comments) Leg pains, bone pains Current Immunizations  Reviewed on 3/1/2018 Name Date Influenza High Dose Vaccine PF 3/1/2018, 10/1/2016, 1/20/2016 Influenza Vaccine 9/4/2014, 10/7/2010 Pneumococcal Conjugate (PCV-13) 1/15/2014 Pneumococcal Polysaccharide (PPSV-23) 7/5/2017  1:51 PM  
 Td 8/14/2016, 8/1/2012 Tdap 6/17/2015 12:55 PM  
  
 Not reviewed this visit You Were Diagnosed With   
  
 Codes Comments Hospital discharge follow-up    -  Primary ICD-10-CM: [address] [facility] ICD-9-CM: V67.59 Chronic atrial fibrillation (HCC)     ICD-10-CM: Q99.4 ICD-9-CM: 427.31 Vitals BP Pulse Temp Resp Height(growth percentile) Weight(growth percentile) 106/59 (BP 1 Location: Left arm, BP Patient Position: Sitting) 84 95.7 °F (35.4 °C) (Oral) 17 5' 1\" (1.549 m) 95 lb (43.1 kg) SpO2 BMI OB Status Smoking Status 97% 17.95 kg/m2 Hysterectomy Never Smoker Vitals History BMI and BSA Data Body Mass Index Body Surface Area  
 17.95 kg/m 2 1.36 m 2 Preferred Pharmacy Pharmacy Name Phone [address] [facility] [address] [phone] Your Updated Medication List  
  
   
This list is accurate as of 5/18/18 10:45 AM.  Always use your most recent med list.  
  
  
  
  
 ergocalciferol 50,000 unit capsule Commonly known as:  ERGOCALCIFEROL Take 1 Cap by mouth every seven (7) days. ferrous sulfate 325 mg (65 mg iron) tablet Take 325 mg by mouth daily. furosemide 40 mg tablet Commonly known as:  LASIX Take 60 mg by mouth daily. levothyroxine 50 mcg tablet Commonly known as:  SYNTHROID  
TAKE 1 TABLET DAILY BEFORE BREAKFAST  
  
 metoprolol tartrate 100 mg IR tablet Commonly known as:  LOPRESSOR Take 100 mg by mouth two (2) times a day. potassium chloride SR 10 mEq tablet Commonly known as:  KLOR-CON 10 Take 1 Tab by mouth daily. warfarin 2 mg tablet Commonly known as:  COUMADIN Take 2 mg by mouth daily. Follow-up Instructions Return in about 2 months (around 7/18/2018) for follow up, with Dr. Augusto Ko. Introducing [facility] & HEALTH [facility]! [facility] introduces [employer] patient portal. Now you can access parts of your medical record, email your doctor's office, and request medication refills online. 1. In your internet browser, go to [email or web address] 2. Click on the First Time User? Click Here link in the Sign In box. You will see the New Member Sign Up page. 3. Enter your [employer] Access Code exactly as it appears below. You will not need to use this code after youve completed the sign-up process. If you do not sign up before the expiration date, you must request a new code. · [employer] Access Code: [email or web address] Expires: 5/30/2018 10:47 AM 
 
4. Enter the last four digits of your Social Security Number (xxxx) and Date of Birth (mm/dd/yyyy) as indicated and click Submit. You will be taken to the next sign-up page. 5. Create a [employer] ID. This will be your [employer] login ID and cannot be changed, so think of one that is secure and easy to remember. 6. Create a [employer] password. You can change your password at any time. 7. Enter your Password Reset Question and Answer. This can be used at a later time if you forget your password. 8. Enter your e-mail address. You will receive e-mail notification when new information is available in [address] 9. Click Sign Up. You can now view and download portions of your medical record. 10. Click the Download Summary menu link to download a portable copy of your medical information. If you have questions, please visit the Frequently Asked Questions section of the [employer] website. Remember, [employer] is NOT to be used for urgent needs. For medical emergencies, dial 911. Now available from your iPhone and Android! Please provide this summary of care documentation to your next provider. Your primary care clinician is listed as Sergio Newell. If you have any questions after today's visit, please call [phone].

## 2018-05-18 NOTE — PROGRESS NOTES
Chief Complaint   Patient presents with   [facility] Follow Up     1. Have you been to the ER, urgent care clinic since your last visit? Hospitalized since your last visit? Yes When: 5/12/2018 Where: [facility] Reason for visit: shortness of breath    2. Have you seen or consulted any other health care providers outside of the [address] [place] [address] since your last visit? Include any pap smears or colon screening.  No

## 2018-05-18 NOTE — PATIENT INSTRUCTIONS
Atrial Fibrillation: Care Instructions  Your Care Instructions    Atrial fibrillation is an irregular and often fast heartbeat. Treating this condition is important for several reasons. It can cause blood clots, which can travel from your heart to your brain and cause a stroke. If you have a fast heartbeat, you may feel lightheaded, dizzy, and weak. An irregular heartbeat can also increase your risk for heart failure. Atrial fibrillation is often the result of another heart condition, such as high blood pressure or coronary artery disease. Making changes to improve your heart condition will help you stay healthy and active. Follow-up care is a key part of your treatment and safety. Be sure to make and go to all appointments, and call your doctor if you are having problems. It's also a good idea to know your test results and keep a list of the medicines you take. How can you care for yourself at home? Medicines  ? · Take your medicines exactly as prescribed. Call your doctor if you think you are having a problem with your medicine. You will get more details on the specific medicines your doctor prescribes. ? · If your doctor has given you a blood thinner to prevent a stroke, be sure you get instructions about how to take your medicine safely. Blood thinners can cause serious bleeding problems. ? · Do not take any vitamins, over-the-counter drugs, or herbal products without talking to your doctor first.   ? Lifestyle changes  ? · Do not smoke. Smoking can increase your chance of a stroke and heart attack. If you need help quitting, talk to your doctor about stop-smoking programs and medicines. These can increase your chances of quitting for good. ? · Eat a heart-healthy diet. ? · Stay at a healthy weight. Lose weight if you need to.   ? · Limit alcohol to 2 drinks a day for men and 1 drink a day for women. Too much alcohol can cause health problems. ? · Avoid colds and flu.  Get a pneumococcal vaccine shot. If you have had one before, ask your doctor whether you need another dose. Get a flu shot every year. If you must be around people with colds or flu, wash your hands often. Activity  ? · If your doctor recommends it, get more exercise. Walking is a good choice. Bit by bit, increase the amount you walk every day. Try for at least 30 minutes on most days of the week. You also may want to swim, bike, or do other activities. Your doctor may suggest that you join a cardiac rehabilitation program so that you can have help increasing your physical activity safely. ? · Start light exercise if your doctor says it is okay. Even a small amount will help you get stronger, have more energy, and manage stress. Walking is an easy way to get exercise. Start out by walking a little more than you did in the hospital. Gradually increase the amount you walk. ? · When you exercise, watch for signs that your heart is working too hard. You are pushing too hard if you cannot talk while you are exercising. If you become short of breath or dizzy or have chest pain, sit down and rest immediately. ? · Check your pulse regularly. Place two fingers on the artery at the palm side of your wrist, in line with your thumb. If your heartbeat seems uneven or fast, talk to your doctor. When should you call for help? Call 911 anytime you think you may need emergency care. For example, call if:  ? · You have symptoms of a heart attack. These may include:  ¨ Chest pain or pressure, or a strange feeling in the chest.  ¨ Sweating. ¨ Shortness of breath. ¨ Nausea or vomiting. ¨ Pain, pressure, or a strange feeling in the back, neck, jaw, or upper belly or in one or both shoulders or arms. ¨ Lightheadedness or sudden weakness. ¨ A fast or irregular heartbeat. After you call 911, the  may tell you to chew 1 adult-strength or 2 to 4 low-dose aspirin. Wait for an ambulance. Do not try to drive yourself.    ? · You have symptoms of a stroke. These may include:  ¨ Sudden numbness, tingling, weakness, or loss of movement in your face, arm, or leg, especially on only one side of your body. ¨ Sudden vision changes. ¨ Sudden trouble speaking. ¨ Sudden confusion or trouble understanding simple statements. ¨ Sudden problems with walking or balance. ¨ A sudden, severe headache that is different from past headaches. ? · You passed out (lost consciousness). ?Call your doctor now or seek immediate medical care if:  ? · You have new or increased shortness of breath. ? · You feel dizzy or lightheaded, or you feel like you may faint. ? · Your heart rate becomes irregular. ? · You can feel your heart flutter in your chest or skip heartbeats. Tell your doctor if these symptoms are new or worse. ? Watch closely for changes in your health, and be sure to contact your doctor if you have any problems. Where can you learn more? Go to http://librado-alaina.info/. Enter U020 in the search box to learn more about \"Atrial Fibrillation: Care Instructions. \"  Current as of: September 21, 2016  Content Version: 11.4  © 5178-9874 [employer]. Care instructions adapted under license by [employer] (which disclaims liability or warranty for this information). If you have questions about a medical condition or this instruction, always ask your healthcare professional. [address] any warranty or liability for your use of this information. Taking Warfarin Safely: Care Instructions  Your Care Instructions    Warfarin is a medicine that you take to prevent blood clots. It is often called a blood thinner. Doctors give warfarin (such as Coumadin) to reduce the risk of blood clots. You may be at risk for blood clots if you have atrial fibrillation or deep vein thrombosis. Some other health problems may also put you at risk. Warfarin slows the amount of time it takes for your blood to clot.  It can cause bleeding problems. Even if you've been taking warfarin for a while, it's important to know how to take it safely. Foods and other medicines can affect the way warfarin works. Some can make warfarin work too well. This can cause bleeding problems. And some can make it work poorly, so that it does not prevent blood clots very well. You will need regular blood tests to check how long it takes for your blood to form a clot. This test is called a PT or prothrombin time test. The result of the test is called an INR level. Depending on the test results, your doctor or anticoagulation clinic may adjust your dose of warfarin. Follow-up care is a key part of your treatment and safety. Be sure to make and go to all appointments, and call your doctor if you are having problems. It's also a good idea to know your test results and keep a list of the medicines you take. How can you care for yourself at home? Take warfarin safely  · Take your warfarin at the same time each day. · If you miss a dose of warfarin, don't take an extra dose to make up for it. Your doctor can tell you exactly what to do so you don't take too much or too little. · Wear medical alert jewelry that lets others know that you take warfarin. You can buy this at most drugstores. · Don't take warfarin if you are pregnant or planning to get pregnant. Talk to your doctor about how you can prevent getting pregnant while you are taking it. · Don't change your dose or stop taking warfarin unless your doctor tells you to. Effects of medicines and food on warfarin  · Don't start or stop taking any medicines, vitamins, or natural remedies unless you first talk to your doctor. Many medicines can affect how warfarin works. These include aspirin and other pain relievers, over-the-counter medicines, multivitamins, dietary supplements, and herbal products. · Tell all of your doctors and pharmacists that you take warfarin.  Some prescription medicines can affect how warfarin works. · Keep the amount of vitamin K in your diet about the same from day to day. Do not suddenly eat a lot more or a lot less food that is rich in vitamin K than you usually do. Vitamin K affects how warfarin works and how your blood clots. Talk with your doctor before making big changes in your diet. Foods that have a lot of vitamin K include cooked green vegetables, such as:  ¨ Kale, spinach, turnip greens, valarie greens, Swiss chard, and mustard greens. ¨ [place] sprouts, broccoli, and asparagus. · Limit your use of alcohol. Avoid bleeding by preventing falls and injuries  · Wear slippers or shoes with nonskid soles. · Remove throw rugs and clutter. · Rearrange furniture and electrical cords to keep them out of walking paths. · Keep stairways, porches, and outside walkways well lit. Use night-lights in hallways and bathrooms. · Be extra careful when you work with sharp tools or knives. When should you call for help? Call 911 anytime you think you may need emergency care. For example, call if:  ? · You have a sudden, severe headache that is different from past headaches. ?Call your doctor now or seek immediate medical care if:  ? · You have any abnormal bleeding, such as:  ¨ Nosebleeds. ¨ Vaginal bleeding that is different (heavier, more frequent, at a different time of the month) than what you are used to. ¨ Bloody or black stools, or rectal bleeding. ¨ Bloody or pink urine. ? Watch closely for changes in your health, and be sure to contact your doctor if you have any problems. Where can you learn more? Go to http://librado-alaina.info/. Enter Z841 in the search box to learn more about \"Taking Warfarin Safely: Care Instructions. \"  Current as of: September 21, 2016  Content Version: 11.4  © 2833-9129 [employer]. Care instructions adapted under license by [employer] (which disclaims liability or warranty for this information).  If you have questions about a medical condition or this instruction, always ask your healthcare professional. [address] any warranty or liability for your use of this information. Atrial Fibrillation: Care Instructions  Your Care Instructions    Atrial fibrillation is an irregular and often fast heartbeat. Treating this condition is important for several reasons. It can cause blood clots, which can travel from your heart to your brain and cause a stroke. If you have a fast heartbeat, you may feel lightheaded, dizzy, and weak. An irregular heartbeat can also increase your risk for heart failure. Atrial fibrillation is often the result of another heart condition, such as high blood pressure or coronary artery disease. Making changes to improve your heart condition will help you stay healthy and active. Follow-up care is a key part of your treatment and safety. Be sure to make and go to all appointments, and call your doctor if you are having problems. It's also a good idea to know your test results and keep a list of the medicines you take. How can you care for yourself at home? Medicines  ? · Take your medicines exactly as prescribed. Call your doctor if you think you are having a problem with your medicine. You will get more details on the specific medicines your doctor prescribes. ? · If your doctor has given you a blood thinner to prevent a stroke, be sure you get instructions about how to take your medicine safely. Blood thinners can cause serious bleeding problems. ? · Do not take any vitamins, over-the-counter drugs, or herbal products without talking to your doctor first.   ? Lifestyle changes  ? · Do not smoke. Smoking can increase your chance of a stroke and heart attack. If you need help quitting, talk to your doctor about stop-smoking programs and medicines. These can increase your chances of quitting for good. ? · Eat a heart-healthy diet.    ? · Stay at a healthy weight. Lose weight if you need to.   ? · Limit alcohol to 2 drinks a day for men and 1 drink a day for women. Too much alcohol can cause health problems. ? · Avoid colds and flu. Get a pneumococcal vaccine shot. If you have had one before, ask your doctor whether you need another dose. Get a flu shot every year. If you must be around people with colds or flu, wash your hands often. Activity  ? · If your doctor recommends it, get more exercise. Walking is a good choice. Bit by bit, increase the amount you walk every day. Try for at least 30 minutes on most days of the week. You also may want to swim, bike, or do other activities. Your doctor may suggest that you join a cardiac rehabilitation program so that you can have help increasing your physical activity safely. ? · Start light exercise if your doctor says it is okay. Even a small amount will help you get stronger, have more energy, and manage stress. Walking is an easy way to get exercise. Start out by walking a little more than you did in the hospital. Gradually increase the amount you walk. ? · When you exercise, watch for signs that your heart is working too hard. You are pushing too hard if you cannot talk while you are exercising. If you become short of breath or dizzy or have chest pain, sit down and rest immediately. ? · Check your pulse regularly. Place two fingers on the artery at the palm side of your wrist, in line with your thumb. If your heartbeat seems uneven or fast, talk to your doctor. When should you call for help? Call 911 anytime you think you may need emergency care. For example, call if:  ? · You have symptoms of a heart attack. These may include:  ¨ Chest pain or pressure, or a strange feeling in the chest.  ¨ Sweating. ¨ Shortness of breath. ¨ Nausea or vomiting. ¨ Pain, pressure, or a strange feeling in the back, neck, jaw, or upper belly or in one or both shoulders or arms. ¨ Lightheadedness or sudden weakness.   ¨ A fast or irregular heartbeat. After you call 911, the  may tell you to chew 1 adult-strength or 2 to 4 low-dose aspirin. Wait for an ambulance. Do not try to drive yourself. ? · You have symptoms of a stroke. These may include:  ¨ Sudden numbness, tingling, weakness, or loss of movement in your face, arm, or leg, especially on only one side of your body. ¨ Sudden vision changes. ¨ Sudden trouble speaking. ¨ Sudden confusion or trouble understanding simple statements. ¨ Sudden problems with walking or balance. ¨ A sudden, severe headache that is different from past headaches. ? · You passed out (lost consciousness). ?Call your doctor now or seek immediate medical care if:  ? · You have new or increased shortness of breath. ? · You feel dizzy or lightheaded, or you feel like you may faint. ? · Your heart rate becomes irregular. ? · You can feel your heart flutter in your chest or skip heartbeats. Tell your doctor if these symptoms are new or worse. ? Watch closely for changes in your health, and be sure to contact your doctor if you have any problems. Where can you learn more? Go to http://librado-alaina.info/. Enter U020 in the search box to learn more about \"Atrial Fibrillation: Care Instructions. \"  Current as of: September 21, 2016  Content Version: 11.4  © 6607-1840 [employer]. Care instructions adapted under license by [employer] (which disclaims liability or warranty for this information). If you have questions about a medical condition or this instruction, always ask your healthcare professional. [place] [address] any warranty or liability for your use of this information. Taking Warfarin Safely: Care Instructions  Your Care Instructions    Warfarin is a medicine that you take to prevent blood clots. It is often called a blood thinner. Doctors give warfarin (such as Coumadin) to reduce the risk of blood clots.  You may be at risk for blood clots if you have atrial fibrillation or deep vein thrombosis. Some other health problems may also put you at risk. Warfarin slows the amount of time it takes for your blood to clot. It can cause bleeding problems. Even if you've been taking warfarin for a while, it's important to know how to take it safely. Foods and other medicines can affect the way warfarin works. Some can make warfarin work too well. This can cause bleeding problems. And some can make it work poorly, so that it does not prevent blood clots very well. You will need regular blood tests to check how long it takes for your blood to form a clot. This test is called a PT or prothrombin time test. The result of the test is called an INR level. Depending on the test results, your doctor or anticoagulation clinic may adjust your dose of warfarin. Follow-up care is a key part of your treatment and safety. Be sure to make and go to all appointments, and call your doctor if you are having problems. It's also a good idea to know your test results and keep a list of the medicines you take. How can you care for yourself at home? Take warfarin safely  · Take your warfarin at the same time each day. · If you miss a dose of warfarin, don't take an extra dose to make up for it. Your doctor can tell you exactly what to do so you don't take too much or too little. · Wear medical alert jewelry that lets others know that you take warfarin. You can buy this at most drugstores. · Don't take warfarin if you are pregnant or planning to get pregnant. Talk to your doctor about how you can prevent getting pregnant while you are taking it. · Don't change your dose or stop taking warfarin unless your doctor tells you to. Effects of medicines and food on warfarin  · Don't start or stop taking any medicines, vitamins, or natural remedies unless you first talk to your doctor. Many medicines can affect how warfarin works.  These include aspirin and other pain relievers, over-the-counter medicines, multivitamins, dietary supplements, and herbal products. · Tell all of your doctors and pharmacists that you take warfarin. Some prescription medicines can affect how warfarin works. · Keep the amount of vitamin K in your diet about the same from day to day. Do not suddenly eat a lot more or a lot less food that is rich in vitamin K than you usually do. Vitamin K affects how warfarin works and how your blood clots. Talk with your doctor before making big changes in your diet. Foods that have a lot of vitamin K include cooked green vegetables, such as:  ¨ Kale, spinach, turnip greens, valarie greens, Swiss chard, and mustard greens. ¨ [place] sprouts, broccoli, and asparagus. · Limit your use of alcohol. Avoid bleeding by preventing falls and injuries  · Wear slippers or shoes with nonskid soles. · Remove throw rugs and clutter. · Rearrange furniture and electrical cords to keep them out of walking paths. · Keep stairways, porches, and outside walkways well lit. Use night-lights in hallways and bathrooms. · Be extra careful when you work with sharp tools or knives. When should you call for help? Call 911 anytime you think you may need emergency care. For example, call if:  ? · You have a sudden, severe headache that is different from past headaches. ?Call your doctor now or seek immediate medical care if:  ? · You have any abnormal bleeding, such as:  ¨ Nosebleeds. ¨ Vaginal bleeding that is different (heavier, more frequent, at a different time of the month) than what you are used to. ¨ Bloody or black stools, or rectal bleeding. ¨ Bloody or pink urine. ? Watch closely for changes in your health, and be sure to contact your doctor if you have any problems. Where can you learn more? Go to http://librado-alaina.info/. Enter Q105 in the search box to learn more about \"Taking Warfarin Safely: Care Instructions. \"  Current as of: September 21, 2016  Content Version: 11.4  © 3314-9005 [employer]. Care instructions adapted under license by [employer] (which disclaims liability or warranty for this information). If you have questions about a medical condition or this instruction, always ask your healthcare professional. [place] [address] any warranty or liability for your use of this information.

## 2018-06-01 ENCOUNTER — PATIENT OUTREACH (OUTPATIENT)
Dept: INTERNAL MEDICINE CLINIC | Age: 68
End: 2018-06-01

## 2018-06-01 NOTE — PROGRESS NOTES
NN contacted the patient by telephone to perform CHF follow Up. Noted Priorities/Plan:  **Weight has never varied in past - SOB has been her only symptom and she is aware of this     Daily Weight: stable - see note above  Zone: green   Signs/Symptoms: Edema none; SOB none; orthopnea none    Goals      Initiate and reinforce education of the patient/family about management of disease and lifestyle changes. 5/17/18 Needs education on heart failure - zones and sx to watch for which may be indicators or worsening CHF. I will mail educational material to pt.  Understands red flags post discharge. 5/17/18 Aware to call for worsening sob , fever, chills            Needs addressed from pathway:   Week 1-4   Provide Daily Disease Management  (NN initiated)  ? Daily weight (Before Breakfast-  Daily Zone Identification (symptom management; weight, edema, SOB, activity/sleep changes)-notify provider immediately as indicated  ? Cardiac Low-sodium Diet (No added sodium; 1500mg as indicated). If  Diabetic: include carbohydrate controlled   ? Fluid restriction (if indicated)  ? Comorbidity Management  ? Confirm follow-up appointments/transportation. Reschedule if needed. Additional assessment   ? Fall precautions    ? Activity tolerance assessment   (eg: Vital signs; level of consciousness; dyspnea on exertion; pillow usage; recliner vs bed)   ? Energy conservation management (balance activity with rest)  ? Labs/diagnostics *as indicated  ? Medication Therapy *as directed  ? Diet/appetite assessment  ? ED/Hospital utilization  ? Immunizations up to date        Pneumonia       Flu  ? Home assessment/modifications * as indicated   ? Transportation  assistance  ? Medication assistance  ? Home health services  ? Personal assistance  ? SNF utilization  Psychosocial: Reassurance/emotional support   Monitoring:  ? Home health  ? [facility]  ? [facility]  ? Tele-monitoring  ?  Cardiac device monitoring  ? My Chart  Education:  ? Advanced care planning status   ? Support system identification ( eg: Caregiver, meal planning, community resources, family, friends, Gn[employer], support group)   ? Health literacy for heart failure  ? Caregiver education and preparation           Have you been to an ER/Hospital since discharge from [facility]? No      Have you followed up with PCP/Cardiologist/Specialist? (dates attended) Saw RYANN Beaver on 5/18/18. She has an appt with Cardiologist next week and will also be seeing pulmonologist in follow up    Transportation:  car  Diet: low salt  Activity/ADLs: no restriction, does rest frequently,  Able to complete ADL's. Medications Reconciled at this time:  no  Home health:  Company/Completion: none  Social Support:  family    Advanced Care Plan: (if not addressed) - not on file - will address at next appt    Patient reminded that there is a physician on call 24 hours a day / 7 days a week (M-F 5pm to 8am and from Friday 5pm until Monday 8a for the weekend) should the patient have questions or concerns. Patient reminded to call 911 if situation is emergent or patient feels the situation is emergent. Pt verbalizes understanding.

## 2018-06-21 ENCOUNTER — PATIENT OUTREACH (OUTPATIENT)
Dept: FAMILY MEDICINE CLINIC | Age: 68
End: 2018-06-21

## 2018-06-21 NOTE — PROGRESS NOTES
Follow-Up      Date/Time:  2018 2:33 PM    History:  Patient was hospitalized with CHF in May of this year. I contacted the patient by telephone in follow up. Verified name and  with patient as identifiers. Patient states she continues to do well. She has been back to see her cardiologist in follow up and she has also been to see pulmonologist.  She has a Chest CT scheduled for next week and pending the results of that, she may not need any further f/u with pulmonologist.    She doesn't weight daily because weight has never been a symptom/indicator for her of CHF. She knows to restrict salt intake and watch for increasing SOB and does well monitoring this. She continues to take her medications as directed. Update on   Goals      Initiate and reinforce education of the patient/family about management of disease and lifestyle changes. 18 Needs education on heart failure - zones and sx to watch for which may be indicators or worsening CHF. I will mail educational material to pt.    18 - Is monitoring sodium intake and sob - aware that sob may be her only symptom or worsening CHF so knows to call right away for worsening sob.  Understands red flags post discharge. 18 Aware to call for worsening sob , fever, chills    18 Continues to monitor - no problems at this time              PCP/Specialist follow up: Future Appointments  Date Time Provider Anish Roldan   2018 9:00 AM Jack Mclain MD [address] [facility] [id number]   2018 8:30 AM MD GUILHERME Sandoval   3/4/2019 8:30 AM MD GUILHERME Sandoval          Will continue to follow for now.

## 2018-07-05 ENCOUNTER — OFFICE VISIT (OUTPATIENT)
Dept: FAMILY MEDICINE CLINIC | Age: 68
End: 2018-07-05

## 2018-07-05 VITALS
HEART RATE: 69 BPM | WEIGHT: 98 LBS | BODY MASS INDEX: 18.5 KG/M2 | SYSTOLIC BLOOD PRESSURE: 111 MMHG | DIASTOLIC BLOOD PRESSURE: 52 MMHG | TEMPERATURE: 95.5 F | RESPIRATION RATE: 16 BRPM | OXYGEN SATURATION: 96 % | HEIGHT: 61 IN

## 2018-07-05 DIAGNOSIS — I48.20 CHRONIC ATRIAL FIBRILLATION (HCC): Primary | ICD-10-CM

## 2018-07-05 DIAGNOSIS — I10 ESSENTIAL HYPERTENSION: ICD-10-CM

## 2018-07-05 DIAGNOSIS — E03.4 HYPOTHYROIDISM DUE TO ACQUIRED ATROPHY OF THYROID: ICD-10-CM

## 2018-07-05 DIAGNOSIS — R59.0 MEDIASTINAL LYMPHADENOPATHY: ICD-10-CM

## 2018-07-05 DIAGNOSIS — I34.2 NON-RHEUMATIC MITRAL VALVE STENOSIS: ICD-10-CM

## 2018-07-05 DIAGNOSIS — E27.8 ADRENAL NODULE (HCC): ICD-10-CM

## 2018-07-05 DIAGNOSIS — I50.9 CONGESTIVE HEART FAILURE, UNSPECIFIED HF CHRONICITY, UNSPECIFIED HEART FAILURE TYPE (HCC): ICD-10-CM

## 2018-07-05 DIAGNOSIS — E78.00 HYPERCHOLESTEROLEMIA: ICD-10-CM

## 2018-07-05 NOTE — PROGRESS NOTES
Chief Complaint   Patient presents with   [facility] Annual Wellness Visit     Patient is fasting   [facility] Follow Up     Windy Miguel for CHF        Pt is a 77y.o. year old female who presents for follow up of her chronic medical problems    Reviewed recent hospitalization records from 5/2018 today with patient    Seen at [facility] for SOB-dx with CHF-Lasix upped to 1 and 1/2 pills/day; Metop increased from 50  To 100 since she ahs been off Dig bec of cost  Saw lung doc since-CT ok, fluid is from her heart she was told; Pulmo note also reviewed and I highlighted the important data  Has follow up with Cardio, Dr. Celena Kirkpatrick in Sept  Feels better now-denies SOB or edema, orthopnea or PND    CT CHEST W/O CONTRAST6/27/2018  [facility]  Result Impression   Impression:  Small right and trace left pleural effusions with associated atelectasis. There is some septal thickening which could suggest a component of superimposed interstitial edema. There is also coronary artery disease and atherosclerosis. Increasing mediastinal lymphadenopathy of uncertain etiology. Patient not aware of this    Increasing isak-fissural nodularity in the lungs, most likely isak-fissural lymph nodes. Indeterminate left adrenal nodule, but stable over multiple year comparison. This is likely benign given stability. Prior cholecystectomy. Cutaneous manifestations of neurofibromatosis. Miscellaneous Notes  - in this encounter    Table of Contents for Miscellaneous Notes  Telephone Encounter - Karyn Sharma MD - 07/03/2018 11:34 AM EDT  Telephone Encounter - Emeli Otto - 06/29/2018 12:51 PM EDT   Telephone Encounter - Karyn Sharma MD - 07/03/2018 11:34 AM EDT  do not see any abnormal nodule on lungs.  Maybe some chronic changes from yrs of 2nd hand smoking.  Has small pleural effusions- I think this is from CHF.  Have routed note to cards- Dr Celena Kirkpatrick. Chely Kendrick full PFTs and repeat CT chest with IV contrast in 2 months with f/u appt with me. Had R-thora 5/2018, exudate, could be pseudo-exudate due to CHF, EF 32%  Talked to Ms Ginger Owens. Will cancel appt with me 7/17. Will reschedule for 2-3 months with me to see PFTs and CT chest with IV. Still on Coumadin; does home monitoring  Hospitalist note reviewed: Afib is from mitral stenosis-mod on recent Echo    Due for mammo-declined for now     Lab Results   Component Value Date/Time    Cholesterol, total 244 (H) 03/01/2018 09:59 AM    HDL Cholesterol 26 (L) 03/01/2018 09:59 AM    LDL,Direct 143 (H) 08/26/2013 09:16 AM    LDL, calculated 162 (H) 03/01/2018 09:59 AM    VLDL, calculated 56 (H) 03/01/2018 09:59 AM    Triglyceride 279 (H) 03/01/2018 09:59 AM    CHOL/HDL Ratio 7.5 (H) 01/20/2016 09:50 AM   Intolerant to statins; declines other meds-watches diet/has decreased velazquez which she admits she loves to eat   had side effects on Zetia so she does not want to try this either    Lab Results   Component Value Date/Time    Vitamin D 25-Hydroxy 7.8 (L) 01/20/2016 09:50 AM    VITAMIN D, 25-HYDROXY 6.9 (L) 03/01/2018 09:59 AM     On OTC-cannot afford Rx    Lab Results   Component Value Date/Time    TSH 1.810 03/01/2018 09:59 AM   on Synthroid-compliant      ROS:    Pt denies: Wt loss, Fever/Chills, HA, Visual changes, Fatigue, Chest pain, SOB, GONZALES, Abd pain, N/V/D/C, Blood in stool or urine, Edema. Pertinent positive as above in HPI.  All others were negative    Patient Active Problem List   Diagnosis Code    Hypercholesterolemia E78.00    Vitamin D deficiency E55.9    Incidental lung nodule, less than or equal to 3mm R91.1    Adrenal nodule ([address] E27.9    Hypothyroidism due to acquired atrophy of thyroid E03.4    Renal cyst N28.1    Advance directive discussed with patient Z71.89    Chronic atrial fibrillation ([address] I48.2    Neurofibromatosis ([address] Q85.00    Age-related osteoporosis without current pathological fracture M81.0    Essential hypertension I10    Non-rheumatic mitral valve stenosis I34.2    Congestive heart failure (HCC) I50.9    Mediastinal lymphadenopathy R59.0       Past Medical History:   Diagnosis Date    Arrhythmia     atrial fibrillation/has mitral valve disease from hx of rheumatic fever    Contact dermatitis and other eczema, due to unspecified cause     Headache(784.0)     hx migraines    Hypercholesterolemia     Hypertension     Neurofibromatosis, unspecified     Renal mass 11/6/2012    Thyroid disease        Current Outpatient Prescriptions   Medication Sig Dispense Refill    ferrous sulfate 325 mg (65 mg iron) tablet Take 325 mg by mouth daily.  furosemide (LASIX) 40 mg tablet Take 60 mg by mouth daily.  metoprolol tartrate (LOPRESSOR) 100 mg IR tablet Take 100 mg by mouth two (2) times a day.  levothyroxine (SYNTHROID) 50 mcg tablet TAKE 1 TABLET DAILY BEFORE BREAKFAST 90 Tab 3    potassium chloride SR (KLOR-CON 10) 10 mEq tablet Take 1 Tab by mouth daily. 90 Tab 3    warfarin (COUMADIN) 2 mg tablet Take 2 mg by mouth daily. History   Smoking Status    Never Smoker   Smokeless Tobacco    Never Used       Allergies   Allergen Reactions    Codeine Nausea Only    Statins-Hmg-Coa Reductase Inhibitors Other (comments)     Leg pains, bone pains       Patient Labs were reviewed: yes      Patient Past Records were reviewed:  yes        Objective:     Vitals:    07/05/18 0839   BP: 111/52   Pulse: 69   Resp: 16   Temp: 95.5 °F (35.3 °C)   TempSrc: Oral   SpO2: 96%   Weight: 98 lb (44.5 kg)   Height: 5' 1\" (1.549 m)     Body mass index is 18.52 kg/(m^2). Exam:   Appearance: alert, well appearing,  oriented to person, place, and time, acyanotic, in no respiratory distress and well hydrated.   HEENT:  NC/AT, pink conj, anicteric sclerae  Neck:  No cervical lymphadenopathy, no JVD, no thyromegaly, no carotid bruit  Heart:  RRR without M/R/G  Lungs:  CTAB, no rhonchi, rales, or wheezes with good air exchange   Abdomen:  Non-tender, pos bowel sounds, no hepatosplenomegaly  Ext:  No C/C/E    Skin: no rash  Neuro: no lateralizing signs, CNs II-XII intact      Assessment/ Plan:   Diagnoses and all orders for this visit:    1. Chronic atrial fibrillation (HCC)-continue with Coumadin and higher dose of Metoprolol    2. Congestive heart failure, unspecified HF chronicity, unspecified heart failure type (HCC)-advised to keep appt with Cardio  -     METABOLIC PANEL, COMPREHENSIVE; Future    3. Hypercholesterolemia-intolerant to statins; discussed low cholesterol diet and goal LDL of less than 100 bec of heart disease    4. Essential hypertension-controlled, continue with present meds  -     METABOLIC PANEL, COMPREHENSIVE; Future    5. Hypothyroidism due to acquired atrophy of thyroid-continue with Synthroid; will check TSh to make sure this was not the cause of her recent rapid A fib  -     TSH 3RD GENERATION; Future    6. Non-rheumatic mitral valve stenosis-mod on recent Xray; advised to follow with Cardio regarding this    7. Adrenal nodule (HCC)-stable on subsequent CT scans    8. Mediastinal lymphadenopathy-new finding to patient; reviewed notes and recent pleural fluid obtained during the hospitalisation did not show any malignant cells; advised to discuss this with Pulmo on her appt there in Sept/will be having repeat CT with contrast then        Follow-up Disposition:  Return in about 6 months (around 1/5/2019) for follow up. I have discussed the diagnosis with the patient and the intended plan as seen in the above orders. The patient has received an After-Visit Summary and questions were answered concerning future plans. Medication Side Effects and Warnings were discussed with patient: yes    Patient verbalized understanding of above instructions.     Zully Timmons MD  Internal Medicine  [facility]

## 2018-07-05 NOTE — PATIENT INSTRUCTIONS
Heart Failure: Care Instructions  Your Care Instructions    Heart failure occurs when your heart does not pump as much blood as the body needs. Failure does not mean that the heart has stopped pumping but rather that it is not pumping as well as it should. Over time, this causes fluid buildup in your lungs and other parts of your body. Fluid buildup can cause shortness of breath, fatigue, swollen ankles, and other problems. By taking medicines regularly, reducing sodium (salt) in your diet, checking your weight every day, and making lifestyle changes, you can feel better and live longer. Follow-up care is a key part of your treatment and safety. Be sure to make and go to all appointments, and call your doctor if you are having problems. It's also a good idea to know your test results and keep a list of the medicines you take. How can you care for yourself at home? Medicines  ? · Be safe with medicines. Take your medicines exactly as prescribed. Call your doctor if you think you are having a problem with your medicine. ? · Do not take any vitamins, over-the-counter medicine, or herbal products without talking to your doctor first. Elmyra Savers not take ibuprofen (Advil or Motrin) and naproxen (Aleve) without talking to your doctor first. They could make your heart failure worse. ? · You may be taking some of the following medicine. ¨ Beta-blockers can slow heart rate, decrease blood pressure, and improve your condition. Taking a beta-blocker may lower your chance of needing to be hospitalized. ¨ Angiotensin-converting enzyme inhibitors (ACEIs) reduce the heart's workload, lower blood pressure, and reduce swelling. Taking an ACEI may lower your chance of needing to be hospitalized again. ¨ Angiotensin II receptor blockers (ARBs) work like ACEIs. Your doctor may prescribe them instead of ACEIs. ¨ Diuretics, also called water pills, reduce swelling.   ¨ Potassium supplements replace this important mineral, which is sometimes lost with diuretics. ¨ Aspirin and other blood thinners prevent blood clots, which can cause a stroke or heart attack. ? You will get more details on the specific medicines your doctor prescribes. Diet  ? · Your doctor may suggest that you limit sodium to 2,000 milligrams (mg) a day or less. That is less than 1 teaspoon of salt a day, including all the salt you eat in cooking or in packaged foods. People get most of their sodium from processed foods. Fast food and restaurant meals also tend to be very high in sodium. ? · Ask your doctor how much liquid you can drink each day. You may have to limit liquids. ?Weight  ? · Weigh yourself without clothing at the same time each day. Record your weight. Call your doctor if you have a sudden weight gain, such as more than 2 to 3 pounds in a day or 5 pounds in a week. (Your doctor may suggest a different range of weight gain.) A sudden weight gain may mean that your heart failure is getting worse. ? Activity level  ? · Start light exercise (if your doctor says it is okay). Even if you can only do a small amount, exercise will help you get stronger, have more energy, and manage your weight and your stress. Walking is an easy way to get exercise. Start out by walking a little more than you did before. Bit by bit, increase the amount you walk. ? · When you exercise, watch for signs that your heart is working too hard. You are pushing yourself too hard if you cannot talk while you are exercising. If you become short of breath or dizzy or have chest pain, stop, sit down, and rest.   ? · If you feel \"wiped out\" the day after you exercise, walk slower or for a shorter distance until you can work up to a better pace. ? · Get enough rest at night. Sleeping with 1 or 2 pillows under your upper body and head may help you breathe easier. ? Lifestyle changes  ? · Do not smoke. Smoking can make a heart condition worse.  If you need help quitting, talk to your doctor about stop-smoking programs and medicines. These can increase your chances of quitting for good. Quitting smoking may be the most important step you can take to protect your heart. ? · Limit alcohol to 2 drinks a day for men and 1 drink a day for women. Too much alcohol can cause health problems. ? · Avoid getting sick from colds and the flu. Get a pneumococcal vaccine shot. If you have had one before, ask your doctor whether you need another dose. Get a flu shot each year. If you must be around people with colds or the flu, wash your hands often. When should you call for help? Call 911 if you have symptoms of sudden heart failure such as:  ? · You have severe trouble breathing. ? · You cough up pink, foamy mucus. ? · You have a new irregular or rapid heartbeat. ?Call your doctor now or seek immediate medical care if:  ? · You have new or increased shortness of breath. ? · You are dizzy or lightheaded, or you feel like you may faint. ? · You have sudden weight gain, such as more than 2 to 3 pounds in a day or 5 pounds in a week. (Your doctor may suggest a different range of weight gain.)   ? · You have increased swelling in your legs, ankles, or feet. ? · You are suddenly so tired or weak that you cannot do your usual activities. ? Watch closely for changes in your health, and be sure to contact your doctor if you develop new symptoms. Where can you learn more? Go to http://librado-alaina.info/. Enter G082 in the search box to learn more about \"Heart Failure: Care Instructions. \"  Current as of: September 21, 2016  Content Version: 11.4  © 6639-9386 [employer]. Care instructions adapted under license by [employer] (which disclaims liability or warranty for this information).  If you have questions about a medical condition or this instruction, always ask your healthcare professional. [address]  any warranty or liability for your use of this information.

## 2018-07-05 NOTE — PROGRESS NOTES
Chief Complaint   Patient presents with   [facility] Annual Wellness Visit     Patient is fasting   [facility] Follow Up     [facility] for CHF      1. Have you been to the ER, urgent care clinic since your last visit? Hospitalized since your last visit? Yes Where: [facility] for CHF    2. Have you seen or consulted any other health care providers outside of the [address] [place] [address] since your last visit? Include any pap smears or colon screening.  Yes Where: Dr. Morales-Cardiology

## 2018-07-05 NOTE — MR AVS SNAPSHOT
Ashley Parker [place] [id number] [address] [facility] [address]. [address] [id number] 
[phone] Patient: Ana Ervin MRN: [id number] DGA:[id number] Visit Information Date & Time Provider Department Dept. Phone Encounter #  
 7/5/2018  9:00 AM Luis Wang MD [address] [place] [address] [id number] Follow-up Instructions Return in about 6 months (around 1/5/2019) for follow up. Your Appointments 9/4/2018  8:30 AM  
Follow Up with MD Liana Roy 23 ([address]) Appt Note: 6 mo f/u  
 [facility]. [address]  
  
   
 [address]  
  
    
 3/4/2019  8:30 AM  
Office Visit with MD Liana Roy 23 ([address]) Appt Note: [address] [place] [address] [address] [facility] [address]  
  
   
 [address] Upcoming Health Maintenance Date Due Influenza Age 5 to Adult 8/1/2018 MEDICARE YEARLY EXAM 3/2/2019 BREAST CANCER SCRN MAMMOGRAM 3/21/2019 GLAUCOMA SCREENING Q2Y 5/1/2019 COLONOSCOPY 1/1/2020 DTaP/Tdap/Td series (2 - Td) 8/14/2026 Allergies as of 7/5/2018  Review Complete On: 7/5/2018 By: Yvonne Zafar LPN Severity Noted Reaction Type Reactions Codeine  02/08/2012    Nausea Only Statins-hmg-coa Reductase Inhibitors  07/20/2016   Intolerance Other (comments) Leg pains, bone pains Current Immunizations  Reviewed on 3/1/2018 Name Date Influenza High Dose Vaccine PF 3/1/2018, 10/1/2016, 1/20/2016 Influenza Vaccine 9/4/2014, 10/7/2010 Pneumococcal Conjugate (PCV-13) 1/15/2014 Pneumococcal Polysaccharide (PPSV-23) 7/5/2017  1:51 PM  
 Td 8/14/2016, 8/1/2012 Tdap 6/17/2015 12:55 PM  
  
 Not reviewed this visit You Were Diagnosed With   
  
 Codes Comments Chronic atrial fibrillation (HCC)    -  Primary ICD-10-CM: I04.5 ICD-9-CM: 427.31 Adrenal nodule ([facility].)     ICD-10-CM: E27.9 ICD-9-CM: 255.8 Hypercholesterolemia     ICD-10-CM: E78.00 ICD-9-CM: 272.0 Essential hypertension     ICD-10-CM: I10 
ICD-9-CM: 401.9 Hypothyroidism due to acquired atrophy of thyroid     ICD-10-CM: E03.4 ICD-9-CM: 244.8, 246.8 Non-rheumatic mitral valve stenosis     ICD-10-CM: I34.2 ICD-9-CM: 424.0 Congestive heart failure, unspecified HF chronicity, unspecified heart failure type ([facility].)     ICD-10-CM: I50.9 ICD-9-CM: 428.0 Mediastinal lymphadenopathy     ICD-10-CM: R59.0 ICD-9-CM: 913. 6 Vitals BP Pulse Temp Resp Height(growth percentile) Weight(growth percentile) 111/52 69 95.5 °F (35.3 °C) (Oral) 16 5' 1\" (1.549 m) 98 lb (44.5 kg) SpO2 BMI OB Status Smoking Status 96% 18.52 kg/m2 Hysterectomy Never Smoker BMI and BSA Data Body Mass Index Body Surface Area 18.52 kg/m 2 1.38 m 2 Preferred Pharmacy Pharmacy Name Phone [address] [phone] Your Updated Medication List  
  
   
This list is accurate as of 7/5/18  9:21 AM.  Always use your most recent med list.  
  
  
  
  
 ferrous sulfate 325 mg (65 mg iron) tablet Take 325 mg by mouth daily. furosemide 40 mg tablet Commonly known as:  LASIX Take 60 mg by mouth daily. levothyroxine 50 mcg tablet Commonly known as:  SYNTHROID  
TAKE 1 TABLET DAILY BEFORE BREAKFAST  
  
 metoprolol tartrate 100 mg IR tablet Commonly known as:  LOPRESSOR Take 100 mg by mouth two (2) times a day. potassium chloride SR 10 mEq tablet Commonly known as:  KLOR-CON 10 Take 1 Tab by mouth daily. warfarin 2 mg tablet Commonly known as:  COUMADIN Take 2 mg by mouth daily. Follow-up Instructions Return in about 6 months (around 1/5/2019) for follow up. To-Do List   
 07/05/2018 Lab:  METABOLIC PANEL, COMPREHENSIVE   
  
 07/05/2018 Lab:  TSH 3RD GENERATION Patient Instructions Heart Failure: Care Instructions Your Care Instructions Heart failure occurs when your heart does not pump as much blood as the body needs. Failure does not mean that the heart has stopped pumping but rather that it is not pumping as well as it should. Over time, this causes fluid buildup in your lungs and other parts of your body. Fluid buildup can cause shortness of breath, fatigue, swollen ankles, and other problems. By taking medicines regularly, reducing sodium (salt) in your diet, checking your weight every day, and making lifestyle changes, you can feel better and live longer. Follow-up care is a key part of your treatment and safety. Be sure to make and go to all appointments, and call your doctor if you are having problems. It's also a good idea to know your test results and keep a list of the medicines you take. How can you care for yourself at home? Medicines ? · Be safe with medicines. Take your medicines exactly as prescribed. Call your doctor if you think you are having a problem with your medicine. ? · Do not take any vitamins, over-the-counter medicine, or herbal products without talking to your doctor first. Arianna Manley not take ibuprofen (Advil or Motrin) and naproxen (Aleve) without talking to your doctor first. They could make your heart failure worse. ? · You may be taking some of the following medicine. ¨ Beta-blockers can slow heart rate, decrease blood pressure, and improve your condition. Taking a beta-blocker may lower your chance of needing to be hospitalized. ¨ Angiotensin-converting enzyme inhibitors (ACEIs) reduce the heart's workload, lower blood pressure, and reduce swelling. Taking an ACEI may lower your chance of needing to be hospitalized again. ¨ Angiotensin II receptor blockers (ARBs) work like ACEIs.  Your doctor may prescribe them instead of ACEIs. ¨ Diuretics, also called water pills, reduce swelling. ¨ Potassium supplements replace this important mineral, which is sometimes lost with diuretics. ¨ Aspirin and other blood thinners prevent blood clots, which can cause a stroke or heart attack. ? You will get more details on the specific medicines your doctor prescribes. Diet ? · Your doctor may suggest that you limit sodium to 2,000 milligrams (mg) a day or less. That is less than 1 teaspoon of salt a day, including all the salt you eat in cooking or in packaged foods. People get most of their sodium from processed foods. Fast food and restaurant meals also tend to be very high in sodium. ? · Ask your doctor how much liquid you can drink each day. You may have to limit liquids. ?Weight ? · Weigh yourself without clothing at the same time each day. Record your weight. Call your doctor if you have a sudden weight gain, such as more than 2 to 3 pounds in a day or 5 pounds in a week. (Your doctor may suggest a different range of weight gain.) A sudden weight gain may mean that your heart failure is getting worse. ? Activity level ? · Start light exercise (if your doctor says it is okay). Even if you can only do a small amount, exercise will help you get stronger, have more energy, and manage your weight and your stress. Walking is an easy way to get exercise. Start out by walking a little more than you did before. Bit by bit, increase the amount you walk. ? · When you exercise, watch for signs that your heart is working too hard. You are pushing yourself too hard if you cannot talk while you are exercising. If you become short of breath or dizzy or have chest pain, stop, sit down, and rest.  
? · If you feel \"wiped out\" the day after you exercise, walk slower or for a shorter distance until you can work up to a better pace. ? · Get enough rest at night.  Sleeping with 1 or 2 pillows under your upper body and head may help you breathe easier. ? Lifestyle changes ? · Do not smoke. Smoking can make a heart condition worse. If you need help quitting, talk to your doctor about stop-smoking programs and medicines. These can increase your chances of quitting for good. Quitting smoking may be the most important step you can take to protect your heart. ? · Limit alcohol to 2 drinks a day for men and 1 drink a day for women. Too much alcohol can cause health problems. ? · Avoid getting sick from colds and the flu. Get a pneumococcal vaccine shot. If you have had one before, ask your doctor whether you need another dose. Get a flu shot each year. If you must be around people with colds or the flu, wash your hands often. When should you call for help? Call 911 if you have symptoms of sudden heart failure such as: 
? · You have severe trouble breathing. ? · You cough up pink, foamy mucus. ? · You have a new irregular or rapid heartbeat. ?Call your doctor now or seek immediate medical care if: 
? · You have new or increased shortness of breath. ? · You are dizzy or lightheaded, or you feel like you may faint. ? · You have sudden weight gain, such as more than 2 to 3 pounds in a day or 5 pounds in a week. (Your doctor may suggest a different range of weight gain.) ? · You have increased swelling in your legs, ankles, or feet. ? · You are suddenly so tired or weak that you cannot do your usual activities. ? Watch closely for changes in your health, and be sure to contact your doctor if you develop new symptoms. Where can you learn more? Go to http://librado-alaina.info/. Enter B463 in the search box to learn more about \"Heart Failure: Care Instructions. \" Current as of: September 21, 2016 Content Version: 11.4 © 9897-6215 [employer].  Care instructions adapted under license by [employer] (which disclaims liability or warranty for this information). If you have questions about a medical condition or this instruction, always ask your healthcare professional. [address] any warranty or liability for your use of this information. Introducing [facility] SERVICES! Rogelio Catherine introduces Graph[employer] patient portal. Now you can access parts of your medical record, email your doctor's office, and request medication refills online. 1. In your internet browser, go to [email or web address] 2. Click on the First Time User? Click Here link in the Sign In box. You will see the New Member Sign Up page. 3. Enter your GraphLab Access Code exactly as it appears below. You will not need to use this code after youve completed the sign-up process. If you do not sign up before the expiration date, you must request a new code. · Graph[employer] Access Code: [email or web address] 
Expires: 10/3/2018  9:21 AM 
 
4. Enter the last four digits of your Social Security Number (xxxx) and Date of Birth (mm/dd/yyyy) as indicated and click Submit. You will be taken to the next sign-up page. 5. Create a GraphLab ID. This will be your GraphLab login ID and cannot be changed, so think of one that is secure and easy to remember. 6. Create a GraphLab password. You can change your password at any time. 7. Enter your Password Reset Question and Answer. This can be used at a later time if you forget your password. 8. Enter your e-mail address. You will receive e-mail notification when new information is available in [address] 9. Click Sign Up. You can now view and download portions of your medical record. 10. Click the Download Summary menu link to download a portable copy of your medical information. If you have questions, please visit the Frequently Asked Questions section of the Graph[employer] website. Remember, GraphLab is NOT to be used for urgent needs. For medical emergencies, dial 911. Now available from your iPhone and Android! Please provide this summary of care documentation to your next provider. Your primary care clinician is listed as Eb Silva. If you have any questions after today's visit, please call [phone].

## 2018-07-06 LAB
ALBUMIN SERPL-MCNC: 4.3 G/DL (ref 3.5–4.8)
ALBUMIN/GLOB SERPL: 1.5 {RATIO} (ref 1.2–2.2)
ALP SERPL-CCNC: 132 IU/L (ref 39–117)
ALT SERPL-CCNC: 10 IU/L (ref 0–32)
AST SERPL-CCNC: 14 IU/L (ref 0–40)
BILIRUB SERPL-MCNC: 0.7 MG/DL (ref 0–1.2)
BUN SERPL-MCNC: 8 MG/DL (ref 8–27)
BUN/CREAT SERPL: 10 (ref 12–28)
CALCIUM SERPL-MCNC: 9.2 MG/DL (ref 8.7–10.3)
CHLORIDE SERPL-SCNC: 97 MMOL/L (ref 96–106)
CO2 SERPL-SCNC: 29 MMOL/L (ref 20–29)
CREAT SERPL-MCNC: 0.79 MG/DL (ref 0.57–1)
GLOBULIN SER CALC-MCNC: 2.8 G/DL (ref 1.5–4.5)
GLUCOSE SERPL-MCNC: 82 MG/DL (ref 65–99)
POTASSIUM SERPL-SCNC: 4 MMOL/L (ref 3.5–5.2)
PROT SERPL-MCNC: 7.1 G/DL (ref 6–8.5)
SODIUM SERPL-SCNC: 142 MMOL/L (ref 134–144)
TSH SERPL DL<=0.005 MIU/L-ACNC: 1.86 UIU/ML (ref 0.45–4.5)

## 2018-07-08 RX ORDER — LEVOTHYROXINE SODIUM 50 UG/1
TABLET ORAL
Qty: 90 TAB | Refills: 3 | Status: SHIPPED | OUTPATIENT
Start: 2018-07-08 | End: 2019-07-30 | Stop reason: SDUPTHER

## 2018-07-10 ENCOUNTER — PATIENT OUTREACH (OUTPATIENT)
Dept: INTERNAL MEDICINE CLINIC | Age: 68
End: 2018-07-10

## 2018-07-10 NOTE — PROGRESS NOTES
CHF Follow Up    Week 9  -     Zone:  Green  Weight Stable  No edema, no SOB, no orthopnea    Taking all of her meds as directed. Called patient today in follow up to her most recent admission with CHF . She states she is doing very well  - her weight is always between 97 and 99 lbs. She saw Dr. Misa Pinto last week and they were both satisfied with her progress. She is going to be following up with [facility] soon. She will be going to [place] the first week of August and staying a month to visit her family. She knows to call right away if develops weight gain, sob, palpitations, fatigue. Patient has graduated from the Disease Specific Care Management  program on 7/10/18. Patient's symptoms are stable at this time. Patient/family has the ability to self-manage. Care management goals have been completed at this time. No further nurse navigator follow up scheduled. Goals Addressed             Most Recent     COMPLETED: Initiate and reinforce education of the patient/family about management of disease and lifestyle changes. On track (6/21/2018)             5/17/18 Needs education on heart failure - zones and sx to watch for which may be indicators or worsening CHF. I will mail educational material to pt.    6/21/18 - Is monitoring sodium intake and sob - aware that sob may be her only symptom or worsening CHF so knows to call right away for worsening sob. 7/10/18 Monitoring sodium intake , weighing daily and is aware to call if develops sob, weight gain.  COMPLETED: Understands red flags post discharge. On track (6/21/2018)             5/17/18 Aware to call for worsening sob , fever, chills    6/21/18 Continues to monitor - no problems at this time    7/10/18 Doing great, no further problems at this time. Pt has nurse navigator's contact information for any further questions, concerns, or needs.   Patients upcoming visits:  Future Appointments  Date Time Provider Anish Duongi   9/4/2018 8:30 AM MD GUILHERME Armstrong SCHED   1/9/2019 9:00 AM MD GUILHERME Armstrong SCHED   3/4/2019 8:30 AM Ethan Massey MD [id number] [facility]

## 2018-10-18 ENCOUNTER — OFFICE VISIT (OUTPATIENT)
Dept: FAMILY MEDICINE CLINIC | Age: 68
End: 2018-10-18

## 2018-10-18 VITALS
BODY MASS INDEX: 19.45 KG/M2 | DIASTOLIC BLOOD PRESSURE: 58 MMHG | HEART RATE: 64 BPM | OXYGEN SATURATION: 98 % | SYSTOLIC BLOOD PRESSURE: 115 MMHG | RESPIRATION RATE: 16 BRPM | HEIGHT: 61 IN | WEIGHT: 103 LBS | TEMPERATURE: 95 F

## 2018-10-18 DIAGNOSIS — Q85.00 NEUROFIBROMATOSIS (HCC): ICD-10-CM

## 2018-10-18 DIAGNOSIS — I48.20 CHRONIC ATRIAL FIBRILLATION (HCC): Primary | ICD-10-CM

## 2018-10-18 DIAGNOSIS — K52.9 GASTROENTERITIS: ICD-10-CM

## 2018-10-18 DIAGNOSIS — I10 ESSENTIAL HYPERTENSION: ICD-10-CM

## 2018-10-18 NOTE — PROGRESS NOTES
Chief Complaint   Patient presents with   [facility] Follow Up      food posioning and a-fib on 10-     Vomiting, diarrhea  Saturday morning   Went to ED Saturday evening [facility]   discharged home Tuesday. Food poisioning     she decline labs today    HPI:    This is a 78 y/o female patient of Dr Selma Hernandez with history of AF,HTN, Hyperlipidemia, CHF and Neurofibromatoses and renal mass. She presents today in company of spouse for hospital follow up. She [place] [facility] [place] 5/16/18 for SOB and chest pain. Today patient denies SOB, CP, dizziness, headache and states she is feeling much better post hospital discharge. She confirm she has appointment scheduled with cardiology in 2 weeks and plan to schedule appointment with pulmonology for recurrent pleural effusion    Hospital Course was as follows:    -The patient was admitted to the hospital with dyspnea new large right pleural effusion and rapid atrial fibrillation and subtherapeutic INR. -She was placed on oxygen, IV Lasix, Lopressor was increased for rapid atrial fibrillation.    -Coumadin was held and she underwent a large-volume diagnostic right thoracentesis, 1300 ML's clear yellow fluid removed with predominantly exudative criteria removed with  (serum ), protein 5,  macrophage predominant, cytology negative    -Echocardiogram shows worsening LV function EF 32% with global hypokinesis compared to prior echo, stable moderate aortic stenosis, severely dilated left atrium, normal pulmonary artery pressure 25 mmHg. nuclear stress test has no evidence of cardiac ischemia. Risk possible she has a tachycardia mediated cardiomyopathy.  Chronic Coumadin is resumed for chronic atrial fibrillation with Lovenox bridging after thoracentesis    - She was seen by cardiology, who further increased Lopressor to 100 mg BID for rapid atrial fibrillation and feel the patient may come to A-V ablation with pacemaker and intercourse in the future with A. fib rate control in the setting of mitral valve disease.     - At discharge rate of fibrillation rate is in the  range at rest her blood pressures low normal and we are unable to start ACEI or ARB at this time reevaluate in cardiology outpatient office setting. She has previously failed outpatient amiodarone. Medical management of mitral stenosis is recommended. - Patient discharged home  discharged home on increased Lopressor, increased Lasix   Advised to follow up with cardiology Dr  Dr. Bhupendra Mathias in the office for further cardiac evaluation and management of rapid atrial flutter fibrillation, mitral stenosis and cardiac myopathy. If she has recurrent pleural effusions requiring thoracentesis and refractory to medical management, she'll need pulmonary consultation and consideration for VATS/CT imaging of chest.          Results for orders placed or performed in visit on 98/50/01   METABOLIC PANEL, COMPREHENSIVE   Result Value Ref Range    Glucose 82 65 - 99 mg/dL    BUN 8 8 - 27 mg/dL    Creatinine 0.79 0.57 - 1.00 mg/dL    GFR est non-AA 76 >59 mL/min/1.73    GFR est AA 88 >59 mL/min/1.73    BUN/Creatinine ratio 10 (L) 12 - 28    Sodium 142 134 - 144 mmol/L    Potassium 4.0 3.5 - 5.2 mmol/L    Chloride 97 96 - 106 mmol/L    CO2 29 20 - 29 mmol/L    Calcium 9.2 8.7 - 10.3 mg/dL    Protein, total 7.1 6.0 - 8.5 g/dL    Albumin 4.3 3.5 - 4.8 g/dL    GLOBULIN, TOTAL 2.8 1.5 - 4.5 g/dL    A-G Ratio 1.5 1.2 - 2.2    Bilirubin, total 0.7 0.0 - 1.2 mg/dL    Alk. phosphatase 132 (H) 39 - 117 IU/L    AST (SGOT) 14 0 - 40 IU/L    ALT (SGPT) 10 0 - 32 IU/L   TSH 3RD GENERATION   Result Value Ref Range    TSH 1.860 0.450 - 4.500 uIU/mL             ROS: Pt denies: Wt loss, Fever/Chills, HA, Visual changes, Fatigue, Chest pain, SOB, GONZALES, Abd pain, N/V/D/C, Blood in stool or urine, Edema. Pertinent positive as above in HPI.  All others were negative            Past Medical History: Diagnosis Date    A-fib [facility])     Arrhythmia     atrial fibrillation/has mitral valve disease from hx of rheumatic fever    Contact dermatitis and other eczema, due to unspecified cause     Headache(784.0)     hx migraines    Hypercholesterolemia     Hypertension     Neurofibromatosis, unspecified     Renal mass 11/6/2012    Thyroid disease      Past Surgical History:   Procedure Laterality Date    ENDOSCOPY, COLON, DIAGNOSTIC      colon surgery lg & sm intestine    HX CHOLECYSTECTOMY      HX GI      for neurofibromatosis    HX GYN      hysterectomy for bleeding       Family History   Problem Relation Age of Onset    Hypertension Mother     Cancer Sister         APPENDIX    Cancer Brother         metastic cancer in remission     Cancer Sister         \"it hardened her arteries\"   [facility] Cancer Sister         colon     Cancer Sister     Cancer Sister      Social History     Socioeconomic History    Marital status:      Spouse name: Not on file    Number of children: Not on file    Years of education: Not on file    Highest education level: Not on file   Social Needs    Financial resource strain: Not on file    Food insecurity - worry: Not on file    Food insecurity - inability: Not on file   [employer] needs - medical: Not on file   [employer] needs - non-medical: Not on file   Occupational History    Not on file   Tobacco Use    Smoking status: Never Smoker    Smokeless tobacco: Never Used   Substance and Sexual Activity    Alcohol use: No    Drug use: No    Sexual activity: Not on file   Other Topics Concern    Not on file   Social History Narrative    Not on file     Current Outpatient Medications   Medication Sig Dispense Refill    levothyroxine (SYNTHROID) 50 mcg tablet TAKE 1 TABLET EVERY DAY BEFORE BREAKFAST 90 Tab 3    ferrous sulfate 325 mg (65 mg iron) tablet Take 325 mg by mouth daily.  furosemide (LASIX) 40 mg tablet Take 60 mg by mouth daily.       metoprolol tartrate (LOPRESSOR) 100 mg IR tablet Take 100 mg by mouth two (2) times a day.  potassium chloride SR (KLOR-CON 10) 10 mEq tablet Take 1 Tab by mouth daily. 90 Tab 3    warfarin (COUMADIN) 2 mg tablet Take 2 mg by mouth daily. Allergies   Allergen Reactions    Codeine Nausea Only    Statins-Hmg-Coa Reductase Inhibitors Other (comments)     Leg pains, bone pains       Physical Exam:    Vital Signs:   Visit Vitals  /58 (BP 1 Location: Left arm, BP Patient Position: At rest)   Pulse 64   Temp 95 °F (35 °C) (Oral)   Resp 16   Ht 5' 1\" (1.549 m)   Wt 103 lb (46.7 kg)   SpO2 98%   BMI 19.46 kg/m²       General: a, a & o x 3, afebrile, well-nourished, interacting appropriately, in no acute distress  HEENT: head normocephalic and atraumatic, conjuctiva clear  Skin: generalized small lumps within the skin  Neck: supple, symmetrical, no palpable mass, no thyromegaly  Respiratory: lung sounds clear bilaterally,  no wheezes or crackles  Cardiovascular: normal S1S2, regular rate and rhythm, no murmurs  Abdomen: non-distended, normal bowel sounds x 4 quadrants, soft, non-tender to palpation  Musculoskeletal: normal ROM on all joints, no swelling or deformity  Psychiatric: a, a & o x 3, appropriate mood and affect, no thought disorder    Assessment/Plan:      ICD-10-CM ICD-9-CM    1. Systolic CHF, chronic (HCC) I50.22 428.22 Continue with Lasix as prescribed     428.0    2. Chronic atrial fibrillation (HCC) I48.2 427.31 AMB POC PT/INRContinue with current dose of coumadin  Keep appointment with cardiology   3. Pleural effusion, right J90 511.9    4. Neurofibromatosis ([facility] Q85.00 237.70    5. Hospital discharge follow-up Z09 V67.59            Additional Notes: Discussed today's diagnosis, treatment plans. Discussed medication indications and side effects. After Visit Summary: Provided and discussed printed patient instructions. Answered questions in relation to today's diagnosis.   Follow-up Disposition: 2 months with Dr Orpha Kawasaki- Huber Mae NP-BC  [facility]  [facility]                DISCHARGE SUMMARY     Admit Date: 10/13/2018  Discharge Date: 10/16/2018    Patient ID:  Rae Seals  77 y.o.  1950    Chief Complaint   Patient presents with    ABDOMINAL PAIN     Discharge Diagnosis:     Principal Problem:  Chronic atrial fibrillation on Coumadin  Gastroenteritis due to food poisoning     Current Discharge Medication List     CONTINUE these medications which have NOT CHANGED   Details   ferrous sulfate (FEOSOL) 325 mg (65 mg Iron) PO TABS Take 1 Tab by Mouth Once a Day. furosemide (LASIX) 40 mg PO TABS Take 1.5 Tabs by Mouth Once a Day. Qty: 135 Tab, Refills: 3     LEVOTHYROXINE SODIUM (LEVOTHYROXINE PO) Take 50 mcg by Mouth Once a Day. metoprolol (LOPRESSOR) 100 mg PO TABS Take 1 Tab by Mouth Twice Daily. Qty: 180 Tab, Refills: 3     potassium chloride SA (K-DUR) 10 mEq PO TbTQ TAKE 1 TABLET EVERY DAY  Qty: 90 Tab, Refills: 3     warfarin (COUMADIN) 2 mg PO TABS Take 1 Tab by Mouth See Admin Instrs. Take 1 tab Sunday- Friday take 0.5 tab on saturday  Qty: 90 Tab, Refills: 3         Operative Procedures: None    Consultants:   Consulting Physicians  Consulting Provider: Calli Sampson MD  Consulting Provider: Galina Palma MD  Consulting Provider: Isaak Musa MD  Consulting Provider: Md, Sofias    HPI ; 77 y old female, who followed by Dr. Concepcion Lindo, cardiology, for a h/o A-fib on Coumadin. She presents to the ER with three days hx of persistent nausea, poor oral intake, multiple emesises and watery diarrhea. Symptoms start with nausea and emesis, she start having watery diarrhea and abdominal ppain since last morning. Patient later developed SOB and palpitations, which may related to missing medications due to nausea and multiple runs of emesis. Patient is compliant and know her medications, but due to emesis, couldn't keep them down.   Per Admission note    Hospital Course: The patient admitted for the following Principal Medical Problem:   Chronic atrial fibrillation With RVR - on Coumadin  Dehydration  Acute Gastroenteritis due to food poisoning   - presented with complain of N/V/D and abd pain , also having poor oral intake  - in the ED found she is in Afib with RVR, treated with IV Cardizem and converted to NSR,   - Afebrile, normal WBC, troponin negative, TSH normal, lytes normal, Hgb 13.1/39.2, Cr/BUN normal, UA some WBC  - CT: Several ileal loops with wall thickening and intramural edema. Adjacent mesenteric edema. No pneumatosis or pneumoperitoneum. Findings are nonspecific but suggest an enteritis which could be inflammatory or infectious in etiology  - Stool WBC few, stool culture pending , C.diff Negative, Urine culture showed Enterococcus faecalis, asymptomatic   - Echo: EF 89%, diastolic dysfunction, MS/MR, resume matt lasix and BB   - diarrhea resolved, afebrile , normal WBC, tele NSR   - The patient condition became clinically stable and No new complain or complication during the hospital course. Other Active Problems managed during this hospitalization:  Skin neurofibromatosis  Diastolic heart failure : on BB and lsix   Chronic anemia on Po iron   Hypothyroidism : on synthroid:     The Medication changes discussed with the patient and family on the discharge    Physical Exam on Discharge:  /69  Pulse 66  Temp 98 °F (36.7 °C)  Resp 16  Ht 60\" (1.524 m)  Wt 47.7 kg (105 lb 3.2 oz)  SpO2 94%  BMI 20.55 kg/m²     General Appearance: Appears in no acute distress. , Sitting up.,   Skin: Skin warm & dry, No rash, No jaundice,   Lymph: There is no lymphadenopathy,   HEENT: PERRLA, EOMI, Moist oral mucous membranes, conjunctiva clear,   Neck: Supple, Without masses,   Lungs: Clear, No wheezes. , No rales. , Normal respiratory effort,   Heart: Regular rate and rhythm, No gallop,   Abdomen: Soft , Non-distended, Normal bowel sounds and Non-tender,   Extremities: No edema of legs, Normal pedal and radial pulses,   Neuro: alert, oriented, affect appropriate, speech fluent, cranial nerves intact, no focal neurological deficits and moves all extremities well    Pertinent Results:   Recent Labs   10/15/18  0351   GLUCOSE 103*   BUN 11   CALCIUM 8.9   CREAT 0.6*      POTASSIUM 3.9   CHLORIDE 102   CO2 25     Recent Labs   10/16/18  0311   WBC 4.9   RBC 3.65*   HEMOGLOBIN 10.0*   HCT 30.9*   MCV 85   MCH 27   MCHC 32   PLATELET 306   RDW 55.6     Tests pending at discharge: None    Condition at discharge: Afebrile, Ambulating, Eating, Drinking, Voiding and Stable    Disposition: Home    Discharge Procedure Orders  Regular Diet     No Activity Restrictions     Additional Instructions as Follows:   - Encourage the ambulation   - Encourage good hydration  - Compliance with medication , diet and follow up appointments  - Schedule a follow up with PCP in 1 weeks  -Take all medications with you to all your follow up appointments  - No New / Changes in home medications: Follow Up with PCP within 7 days   Follow up with Mingo More MD within 7 days. Follow Up Info (next 45 days)   Follow up with Bandar James MD   Specialty: Internal Medicine   Phone: [phone]   Where: [facility]   Wednesday Oct 24, 2018   Virtual Coumadin with ANTICOAG VIRTUAL at 4:10 PM   Where: [facility] )   [facility]  [address] [place] [address] Tanya Cordovavard [id number] [phone]       Total discharge preparation time was 45 minutes. Sunday MD Chrsi    [address] [place] [address], [address] [place] [address] Pager  [phone] Dedicated pager for off hours   Office : [phone]              Medications at Time of Discharge  - as of this encounter    Medication Sig. Disp.  Refills Start Date End Date   ferrous sulfate (FEOSOL) 325 mg (65 mg Iron) PO TABS   Take 1 Tab by Mouth Once a Day.     05/17/2018     furosemide (LASIX) 40 mg PO TABS   Take 1.5 Tabs by Mouth Once a Day. 135 Tab   3 05/17/2018     LEVOTHYROXINE SODIUM (LEVOTHYROXINE PO)   Take 50 mcg by Mouth Once a Day.     12/07/2010     metoprolol (LOPRESSOR) 100 mg PO TABS   Take 1 Tab by Mouth Twice Daily. 180 Tab   3 05/16/2018     potassium chloride SA (K-DUR) 10 mEq PO TbTQ   TAKE 1 TABLET EVERY DAY 90 Tab   3 09/04/2018     warfarin (COUMADIN) 2 mg PO TABS   Take 1 Tab by Mouth See Admin Instrs. Take 1 tab Sunday- Friday take 0.5 tab on saturday 90 Tab   3 06/04/2018       Discharge Disposition  - in this encounter    Disposition Code [address] / Assisted Living     Home     Progress Notes  - in this encounter    Table of Contents for Progress Notes   Osorio Champion, JASON - 10/16/2018 11:13 AM EDT   Issa Man RN - 10/16/2018 10:34 AM EDT   Catalina Arango NP - 10/16/2018 10:34 AM EDT   Asher Enrique MD - 10/15/2018 1:36 PM EDT   Issa Man RN - 10/15/2018 12:01 PM EDT   Marina Fry RN - 10/15/2018 4:33 AM EDT   Wale Lindo RN - 10/14/2018 1:15 PM EDT   Wale Lindo RN - 10/14/2018 12:03 PM EDT   Wale Lindo RN - 10/14/2018 10:51 AM EDT   Emma Martinez RN - 10/14/2018 12:00 AM EDT   River Aparicio - 10/13/2018 7:42 PM EDT      Osorio Champion RN - 10/16/2018 11:13 AM EDT  Pt ready for discharge. Reviewed all instructions, answered all questions, discussed meds and their side effects; diet, activity and emphasized importance of follow up appointments. Pt verbalized understanding. No new Rx's. Taken to front entrance for ride home.    Back to top of Progress Notes  Issa Man RN - 10/16/2018 10:34 AM EDT  Transition Unit Hand Off Tool   ? *PLEASE HELP PATIENT GET DRESSED AND GATHER BELONGINGS*   ? 1. Spoke with Care Coordination: Yes   - Are there any outstanding needs prior to transfer/discharge: No   ?   2.  Has a ride been arranged: Yes   - What time will they arrive for pickup: N/A, here at hospital  - Do we need to contact family/ride No   ?   3. Prescriptions (hard copy in hand, sent to pharmacy, etc) N/A     4. Flu Shot given: refused  -If NO, please address prior to transfer  ? 5. Remove Telemetry, SCD's, Lines/Drains, etc.  Please inform Transition Unit RN if patient needs IV removed, or being discharged with a Line/Drain  ? 6. Any additional information -- call the Transition Unit RN at [phone]        Back to top of Progress Notes  Catalina Mack NP - 10/16/2018 10:34 AM EDT  Seen 10/13 by Dr Barbara Larios and admitted      Back to top of Progress Notes  Mary Stacy MD - 10/15/2018 1:36 PM EDT  Formatting of this note may be different from the original.    [address] [place] [address]    Assessment / Plan:     Principal Problem:  Chronic atrial fibrillation With RVR - on Coumadin  Acute gastroenteritis / Diarrhea / Melena   Dehydration  UTI (urinary tract infection)  - presented with complain of N/V/D and abd pain , also having poor oral intake  - in the ED found she is in Afib with RVR, treated with IV Cardizem and converted to NSR,   - Afebrile, normal WBC, troponin negative, TSH normal, lytes normal, Hgb 13.1/39.2, Cr/BUN normal, UA some WBC  - CT: Several ileal loops with wall thickening and intramural edema. Adjacent mesenteric edema. No pneumatosis or pneumoperitoneum.  Findings are nonspecific but suggest an enteritis which could be inflammatory or infectious in etiology  - Stool WBC few, stool culture pending , C.diff Negative, Urine culture showed Enterococcus faecalis  - Echo: EF 94%, diastolic dysfunction, MS/MR, resume matt lasix and BB   - Still having diarrhea, GI consulted , Cipro/flagyl added     Active Problems:  Skin neurofibromatosis  Diastolic heart failure : on BB and lsix   Chronic anemia on Po iron   Hypothyroidism : on synthroid     Code Status: Full code   DVT prophylaxis: pharmacologic and mechanical    Plan: Add falgyl /copro  FOB   GI consult   Monitor H/h  Continue with Lopressor and coumadin for now     Subjective:   Patient seen in follow-up of the above medical problems   Patient currently denies chest discomfort, shortness of breath, bleeding, dizzinessand voiding difficulty. still c/o N/V/D    Discussed with patient, nursing staff and . .     Scheduled Medications:    ciprofloxacin 200 mg Q12H   [START ON 10/16/2018] furosemide 20 mg Daily   levothyroxine 50 mcg Daily at 6AM   metoprolol 100 mg BID   metroNIDAZOLE 500 mg Q8H   warfarin - reminder, obtain daily order 1 Each Q PM 1800     Vitals:   10/14/18 2315 10/15/18 0345 10/15/18 0726 10/15/18 1213   BP: 105/69 133/68 121/58 131/63   Pulse: 90 57 78   Resp: 16 15 16 16   Temp: 98.1 °F (36.7 °C) 98.6 °F (37 °C) 97.5 °F (36.4 °C) 97.4 °F (36.3 °C)   SpO2: 96% 97% 97% 97%   Weight: 49.4 kg (108 lb 14.4 oz)   Height:     Temp (24hrs), Av °F (36.7 °C), Min:97.4 °F (36.3 °C), Max:98.6 °F (37 °C)    Intake/Output Summary (Last 24 hours) at 10/15/18 1346  Last data filed at 10/15/18 0932  Gross per 24 hour   Intake 1580 ml   Output 0 ml   Net 1580 ml     Physical Exam:   General Appearance: Appears in no acute distress. ,   Skin: Skin warm & dry, No rash, No jaundice,   Lymph: There is no lymphadenopathy,   HEENT: PERRLA, EOMI, Moist oral mucous membranes, conjunctiva clear,   Neck: Supple, Without masses,   Lungs: Clear, No wheezes. , No rales. , Normal respiratory effort,   Heart: Regular rate and rhythm, No gallop,   Abdomen: Soft , Non-distended, Normal bowel sounds and Non-tender,   Extremities: No edema of legs, Normal pedal and radial pulses,   Neuro: alert, oriented, affect appropriate, speech fluent, cranial nerves intact, no focal neurological deficits and moves all extremities well    Results for orders placed or performed during the hospital encounter of 10/13/18 (from the past 24 hour(s)) PT-INR-   Result Value Ref Range   PROTIME 11.8 9.0 - 13.0 sec   INR 1.09 0.89 - 3.55   BASIC METABOLIC PANEL   Result Value Ref Range   Potassium 3.9 3.5 - 5.5 mmol/L   SODIUM 139 133 - 145 mmol/L   CHLORIDE 102 98 - 110 mmol/L   Glucose 103 (H) 70 - 99 mg/dL   CALCIUM 8.9 8.4 - 10.5 mg/dL   BUN 11 6 - 22 mg/dL   CREATININE 0.6 (L) 0.8 - 1.4 mg/dL   CO2 25 20 - 32 mmol/L   eGFR African American >60.0 >60.0   eGFR Non African American >60.0 >60.0   ANION GAP 12.0 mmol/L   CBC WITH DIFFERENTIAL AUTO   Result Value Ref Range   WBC x 10*3 8.2 4.0 - 11.0 K/uL   RBC x 10^6 4.11 3.80 - 5.20 M/uL   HGB 11.2 (L) 11.7 - 16.1 g/dL   HCT 36.0 35.1 - 48.3 %   MCV 88 80 - 95 fL   MCH 27 26 - 34 pg   MCHC 31 31 - 36 g/dL   RDW 15.9 (H) 10.0 - 15.5 %   Platelet 737 112 - 501 K/uL   MPV 10.4 9.0 - 13.0 fL   Segmented Neutrophils 76 (H) 40 - 75 %   Lymphocytes 10 (L) 20 - 45 %   Monocytes 9 3 - 12 %   Eosinophil 4 0 - 6 %   Basophils 0 0 - 2 %   Absolute Neutrophils 6.2 1.8 - 7.7 K/uL   Absolute Lymphocytes 0.9 (L) 1.0 - 4.8 K/uL   Absolute Monocyte Count 0.8 0.1 - 1.0 K/uL   Absolute Eosinophil 0.4 0.0 - 0.5 K/uL   Absolute Basophil Count 0.0 0.0 - 0.2 K/uL   STOOL FOR WBC'S   Result Value Ref Range   Fecal PMNs Few (A) None   ENTERIC GRAM STAIN   Result Value Ref Range   GRAM STAIN Few PMNs   GRAM STAIN   No Gram Positive Cocci in clusters, yeast,or small curved Negative rods predominating     Total time spent with chart review, Lab review, patient examination/education,discussion with staff on case,documentation and medication management / adjustment  more than 45 minutes     [address] [place] [address], [address] [place] [address] Pager   [phone] [facility] Cell   [phone] Dedicated pager After 5 PM  [phone]  Gregory Medrano (available during business hours)       Back to top of Progress Notes  Deborah Brody RN - 10/15/2018 12:01 PM EDT

## 2018-10-18 NOTE — PATIENT INSTRUCTIONS
Atrial Fibrillation: Care Instructions  Your Care Instructions    Atrial fibrillation is an irregular and often fast heartbeat. Treating this condition is important for several reasons. It can cause blood clots, which can travel from your heart to your brain and cause a stroke. If you have a fast heartbeat, you may feel lightheaded, dizzy, and weak. An irregular heartbeat can also increase your risk for heart failure. Atrial fibrillation is often the result of another heart condition, such as high blood pressure or coronary artery disease. Making changes to improve your heart condition will help you stay healthy and active. Follow-up care is a key part of your treatment and safety. Be sure to make and go to all appointments, and call your doctor if you are having problems. It's also a good idea to know your test results and keep a list of the medicines you take. How can you care for yourself at home? Medicines    · Take your medicines exactly as prescribed. Call your doctor if you think you are having a problem with your medicine. You will get more details on the specific medicines your doctor prescribes.     · If your doctor has given you a blood thinner to prevent a stroke, be sure you get instructions about how to take your medicine safely. Blood thinners can cause serious bleeding problems.     · Do not take any vitamins, over-the-counter drugs, or herbal products without talking to your doctor first.    Lifestyle changes    · Do not smoke. Smoking can increase your chance of a stroke and heart attack. If you need help quitting, talk to your doctor about stop-smoking programs and medicines. These can increase your chances of quitting for good.     · Eat a heart-healthy diet.     · Stay at a healthy weight. Lose weight if you need to.     · Limit alcohol to 2 drinks a day for men and 1 drink a day for women. Too much alcohol can cause health problems.     · Avoid colds and flu.  Get a pneumococcal vaccine shot. If you have had one before, ask your doctor whether you need another dose. Get a flu shot every year. If you must be around people with colds or flu, wash your hands often. Activity    · If your doctor recommends it, get more exercise. Walking is a good choice. Bit by bit, increase the amount you walk every day. Try for at least 30 minutes on most days of the week. You also may want to swim, bike, or do other activities. Your doctor may suggest that you join a cardiac rehabilitation program so that you can have help increasing your physical activity safely.     · Start light exercise if your doctor says it is okay. Even a small amount will help you get stronger, have more energy, and manage stress. Walking is an easy way to get exercise. Start out by walking a little more than you did in the hospital. Gradually increase the amount you walk.     · When you exercise, watch for signs that your heart is working too hard. You are pushing too hard if you cannot talk while you are exercising. If you become short of breath or dizzy or have chest pain, sit down and rest immediately.     · Check your pulse regularly. Place two fingers on the artery at the palm side of your wrist, in line with your thumb. If your heartbeat seems uneven or fast, talk to your doctor. When should you call for help? Call 911 anytime you think you may need emergency care. For example, call if:    · You have symptoms of a heart attack. These may include:  ? Chest pain or pressure, or a strange feeling in the chest.  ? Sweating. ? Shortness of breath. ? Nausea or vomiting. ? Pain, pressure, or a strange feeling in the back, neck, jaw, or upper belly or in one or both shoulders or arms. ? Lightheadedness or sudden weakness. ? A fast or irregular heartbeat. After you call 911, the  may tell you to chew 1 adult-strength or 2 to 4 low-dose aspirin. Wait for an ambulance.  Do not try to drive yourself.     · You have symptoms of a stroke. These may include:  ? Sudden numbness, tingling, weakness, or loss of movement in your face, arm, or leg, especially on only one side of your body. ? Sudden vision changes. ? Sudden trouble speaking. ? Sudden confusion or trouble understanding simple statements. ? Sudden problems with walking or balance. ? A sudden, severe headache that is different from past headaches.     · You passed out (lost consciousness).    Call your doctor now or seek immediate medical care if:    · You have new or increased shortness of breath.     · You feel dizzy or lightheaded, or you feel like you may faint.     · Your heart rate becomes irregular.     · You can feel your heart flutter in your chest or skip heartbeats. Tell your doctor if these symptoms are new or worse.    Watch closely for changes in your health, and be sure to contact your doctor if you have any problems. Where can you learn more? Go to http://librado-alaina.info/. Enter U020 in the search box to learn more about \"Atrial Fibrillation: Care Instructions. \"  Current as of: December 6, 2017  Content Version: 11.8  © 8321-8519 [email or web address]. Care instructions adapted under license by [employer] (which disclaims liability or warranty for this information). If you have questions about a medical condition or this instruction, always ask your healthcare professional. [address] any warranty or liability for your use of this information. Abdominal Pain: Care Instructions  Your Care Instructions    Abdominal pain has many possible causes. Some aren't serious and get better on their own in a few days. Others need more testing and treatment. If your pain continues or gets worse, you need to be rechecked and may need more tests to find out what is wrong. You may need surgery to correct the problem.   Don't ignore new symptoms, such as fever, nausea and vomiting, urination problems, pain that gets worse, and dizziness. These may be signs of a more serious problem. Your doctor may have recommended a follow-up visit in the next 8 to 12 hours. If you are not getting better, you may need more tests or treatment. The doctor has checked you carefully, but problems can develop later. If you notice any problems or new symptoms, get medical treatment right away. Follow-up care is a key part of your treatment and safety. Be sure to make and go to all appointments, and call your doctor if you are having problems. It's also a good idea to know your test results and keep a list of the medicines you take. How can you care for yourself at home? · Rest until you feel better. · To prevent dehydration, drink plenty of fluids, enough so that your urine is light yellow or clear like water. Choose water and other caffeine-free clear liquids until you feel better. If you have kidney, heart, or liver disease and have to limit fluids, talk with your doctor before you increase the amount of fluids you drink. · If your stomach is upset, eat mild foods, such as rice, dry toast or crackers, bananas, and applesauce. Try eating several small meals instead of two or three large ones. · Wait until 48 hours after all symptoms have gone away before you have spicy foods, alcohol, and drinks that contain caffeine. · Do not eat foods that are high in fat. · Avoid anti-inflammatory medicines such as aspirin, ibuprofen (Advil, Motrin), and naproxen (Aleve). These can cause stomach upset. Talk to your doctor if you take daily aspirin for another health problem. When should you call for help? Call 911 anytime you think you may need emergency care.  For example, call if:    · You passed out (lost consciousness).     · You pass maroon or very bloody stools.     · You vomit blood or what looks like coffee grounds.     · You have new, severe belly pain.    Call your doctor now or seek immediate medical care if:    · Your pain gets worse, especially if it becomes focused in one area of your belly.     · You have a new or higher fever.     · Your stools are black and look like tar, or they have streaks of blood.     · You have unexpected vaginal bleeding.     · You have symptoms of a urinary tract infection. These may include:  ? Pain when you urinate. ? Urinating more often than usual.  ? Blood in your urine.     · You are dizzy or lightheaded, or you feel like you may faint.    Watch closely for changes in your health, and be sure to contact your doctor if:    · You are not getting better after 1 day (24 hours). Where can you learn more? Go to http://librado-alaina.info/. Enter M100 in the search box to learn more about \"Abdominal Pain: Care Instructions. \"  Current as of: November 20, 2017  Content Version: 11.8  © 0019-0416 [employer]. Care instructions adapted under license by [email or web address] (which disclaims liability or warranty for this information). If you have questions about a medical condition or this instruction, always ask your healthcare professional. [address] any warranty or liability for your use of this information.

## 2018-10-20 ENCOUNTER — PATIENT OUTREACH (OUTPATIENT)
Dept: FAMILY MEDICINE CLINIC | Age: 68
End: 2018-10-20

## 2018-10-20 NOTE — PROGRESS NOTES
Nurse Navigator Hospital Follow Up Note 
-10/13/18 Admitted at [facility] for abd pain, afib 
-10/16/18 Discharged to home 
-10/18/18 f/u with NP Ingrid Soulier  
-10/20/18 NN contact Hospital Discharge Follow-Up Date/Time:  10/20/2018 1:57 PM 
 
Patient was admitted to [facility] on 10/13/18 and discharged on 10/16/18 for abd pain, afib. The physician discharge summary was available at the time of outreach. Patient was contacted on 10/20/18, pt follow up at [facility] 10/18/18 Top Challenges reviewed with the provider Method of communication with provider :none Inpatient RRAT score: n/a Was this a readmission? no  
Patient stated reason for the readmission: n/a Nurse Navigator (NN) contacted the patient by telephone to perform post hospital discharge assessment. Verified name and  with patient as identifiers. Provided introduction to self, and explanation of the Nurse Navigator role. Pt reports she is doing \"OK. \" Pt denies any CP, SOB, swelling, bleeding, fever/chills, NVD, abd pain difficulty urinating. Pt reports her  helps her at home. Pt followed up with NP Ingrid Soulier and pt currently denies any questions/concerns at this time. Reviewed discharge instructions and red flags with patient who verbalized understanding. Discussed with patient regarding low sodium diet. Patient verbalized understanding Patient given an opportunity to ask questions and does not have any further questions or concerns at this time. The patient agrees to contact the PCP office for questions related to their healthcare. NN provided contact information for future reference. Disease Specific:   N/A Summary of patient's top problems: 1. afib 2. CHF 3. uti Home Health orders at discharge: none [address]: n/a Date of initial visit: n/a Durable Medical Equipment ordered/company: n/a Durable Medical Equipment received: n/a 
 
 Barriers to care? None identified at this time Advance Care Planning:  
Does patient have an Advance Directive: not reviewed Medication(s):  
New Medications at Discharge: n/a Changed Medications at Discharge: n/a Discontinued Medications at Discharge: n/a Medication reconciliation was performed with patient, who verbalizes understanding of administration of home medications. There were no barriers to obtaining medications identified at this time. Pt currently denies an refills at this time. Pt reports she is taking coumadin daily. She checks her INR at home and receives a call to report her result to her Cardiologist's office. Next check in 10/24/18 Referral to Pharm D needed: no  
 
Current Outpatient Medications Medication Sig  levothyroxine (SYNTHROID) 50 mcg tablet TAKE 1 TABLET EVERY DAY BEFORE BREAKFAST  ferrous sulfate 325 mg (65 mg iron) tablet Take 325 mg by mouth daily.  furosemide (LASIX) 40 mg tablet Take 60 mg by mouth daily.  metoprolol tartrate (LOPRESSOR) 100 mg IR tablet Take 100 mg by mouth two (2) times a day.  potassium chloride SR (KLOR-CON 10) 10 mEq tablet Take 1 Tab by mouth daily.  warfarin (COUMADIN) 2 mg tablet Take 2 mg by mouth daily. No current facility-administered medications for this visit. There are no discontinued medications. [facility] follow up appointment(s):  
Future Appointments Date Time Provider Anish Roldan 1/9/2019  9:00 AM MD GUILHERME Valerio  
3/4/2019  8:30 AM Sonia Miles MD [address] [facility] [address] Pt aware of her next appt with PCP. Advised to call the office for any questions/concerns if she needs to be soon sooner. Pt also reports that she is awaiting on call form her Cardio office Dr. Yamilka Lao for her appt for her Echo and f/u appt. Discussed with patient the importance of following up with PCP and other specialists. Patient verbalized understanding Non-[facility] follow up appointment(s): Cardio tbd [facility]:  n/a  
 
Goals  Prevent complications post hospitalization. -pt to monitor weight daily 
 
-pt will adhere to low salt diet 
 
-pt will take all prescribed meds as directed 
 
-pt will follow up with Cardio as directed 
 
-pt will recognize red flag (fever/chills, CP, SOB, increased weight, increased swelling, bleeding, palpitations) and contact PCP office/Cardio office, or seek emergency assistance

## 2018-11-08 ENCOUNTER — PATIENT OUTREACH (OUTPATIENT)
Dept: FAMILY MEDICINE CLINIC | Age: 68
End: 2018-11-08

## 2019-01-03 ENCOUNTER — OFFICE VISIT (OUTPATIENT)
Dept: FAMILY MEDICINE CLINIC | Age: 69
End: 2019-01-03

## 2019-01-03 VITALS
DIASTOLIC BLOOD PRESSURE: 47 MMHG | HEART RATE: 74 BPM | HEIGHT: 61 IN | SYSTOLIC BLOOD PRESSURE: 105 MMHG | BODY MASS INDEX: 17.82 KG/M2 | RESPIRATION RATE: 18 BRPM | TEMPERATURE: 94.1 F | WEIGHT: 94.4 LBS | OXYGEN SATURATION: 99 %

## 2019-01-03 DIAGNOSIS — Z79.01 ANTICOAGULANT LONG-TERM USE: ICD-10-CM

## 2019-01-03 DIAGNOSIS — R39.11 HESITANCY OF MICTURITION: ICD-10-CM

## 2019-01-03 DIAGNOSIS — R42 DIZZINESS: ICD-10-CM

## 2019-01-03 DIAGNOSIS — R19.7 DIARRHEA, UNSPECIFIED TYPE: ICD-10-CM

## 2019-01-03 DIAGNOSIS — R11.0 NAUSEA: ICD-10-CM

## 2019-01-03 DIAGNOSIS — K52.9 GASTROENTERITIS: Primary | ICD-10-CM

## 2019-01-03 LAB
INR BLD: 1.6
PT POC: 19.4 SECONDS
VALID INTERNAL CONTROL?: YES

## 2019-01-03 NOTE — PROGRESS NOTES
Subjective:   Vinayak Cardona is a 77 y.o. female here for an acute visit for    Chief Complaint   Patient presents with    Nausea     Dizziness and lost weight. HPI    Onset 3-4 weeks ago   Location Head and gastric   Duration constant   Characteristics Poor appetite: bfk nothing, lunch nothing, dinner crackers h20 16oz a day, pee 2-3 imes a day, orange yellow pee, nausea, dizzy a lot,   Difficulty peeing   Diarrhea loose watery stools, brown in color   Aggrevating nothing   Relieving nothing   Treatment    Pertinent PMH Chf, a-fib,    Pertinent negatives Fever, syncope or near syncope, did not have flu shot for this season     ROS  As above, the rest are negative   ROS    Current Outpatient Medications   Medication Sig Dispense Refill    levothyroxine (SYNTHROID) 50 mcg tablet TAKE 1 TABLET EVERY DAY BEFORE BREAKFAST 90 Tab 3    ferrous sulfate 325 mg (65 mg iron) tablet Take 325 mg by mouth daily.  furosemide (LASIX) 40 mg tablet Take 60 mg by mouth daily.  metoprolol tartrate (LOPRESSOR) 100 mg IR tablet Take 100 mg by mouth two (2) times a day.  potassium chloride SR (KLOR-CON 10) 10 mEq tablet Take 1 Tab by mouth daily. 90 Tab 3    warfarin (COUMADIN) 2 mg tablet Take 2 mg by mouth daily.               Patient Active Problem List   Diagnosis Code    Hypercholesterolemia E78.00    Vitamin D deficiency E55.9    Incidental lung nodule, less than or equal to 3mm R91.1    Adrenal nodule ([address] E27.9    Hypothyroidism due to acquired atrophy of thyroid E03.4    Renal cyst N28.1    Advance directive discussed with patient Z71.89    Chronic atrial fibrillation ([address] I48.2    Neurofibromatosis ([address] Q85.00    Age-related osteoporosis without current pathological fracture M81.0    Essential hypertension I10    Non-rheumatic mitral valve stenosis I34.2    Congestive heart failure (HCC) I50.9    Mediastinal lymphadenopathy R59.0    Anticoagulant long-term use Z79.01       Allergies Allergen Reactions    Codeine Nausea Only    Statins-Hmg-Coa Reductase Inhibitors Other (comments)     Leg pains, bone pains     Family History   Problem Relation Age of Onset    Hypertension Mother     Cancer Sister         APPENDIX    Cancer Brother         metastic cancer in remission     Cancer Sister         \"it hardened her arteries\"    Cancer Sister         colon     Cancer Sister     Cancer Sister        Objective:     Vitals:    01/03/19 1244   BP: 105/47   Pulse: 74   Resp: 18   Temp: (!) 94.1 °F (34.5 °C)   TempSrc: Oral   SpO2: 99%   Weight: 94 lb 6.4 oz (42.8 kg)   Height: 5' 1\" (1.549 m)     Body mass index is 17.84 kg/m². Physical Exam  Physical Exam   Constitutional: She is oriented to person, place, and time. Vital signs are normal. She appears unhealthy. HENT:   Right Ear: Hearing, tympanic membrane, external ear and ear canal normal.   Left Ear: Hearing, tympanic membrane, external ear and ear canal normal.   Nose: Nose normal.   Mouth/Throat: Oropharynx is clear and moist and mucous membranes are normal.   Eyes: Conjunctivae and lids are normal. Pupils are equal, round, and reactive to light. Cardiovascular:   Standing 120/61 blood pressure, sitting 105/47   Neurological: She is oriented to person, place, and time. Nursing note and vitals reviewed.         Labwork and Ancillary Studies:    CBC w/Diff  Lab Results   Component Value Date/Time    WBC 6.9 03/21/2018 08:57 AM    HGB 10.7 (L) 03/21/2018 08:57 AM    PLATELET 423 58/78/2547 08:57 AM         Basic Metabolic Profile  Lab Results   Component Value Date/Time    Sodium 142 07/05/2018 09:25 AM    Potassium 4.0 07/05/2018 09:25 AM    Chloride 97 07/05/2018 09:25 AM    CO2 29 07/05/2018 09:25 AM    Anion gap 4 01/20/2016 09:50 AM    Glucose 82 07/05/2018 09:25 AM    BUN 8 07/05/2018 09:25 AM    Creatinine 0.79 07/05/2018 09:25 AM    BUN/Creatinine ratio 10 (L) 07/05/2018 09:25 AM    GFR est AA 88 07/05/2018 09:25 AM    GFR est non-AA 76 07/05/2018 09:25 AM    Calcium 9.2 07/05/2018 09:25 AM        Cholesterol  Lab Results   Component Value Date/Time    Cholesterol, total 244 (H) 03/01/2018 09:59 AM    HDL Cholesterol 26 (L) 03/01/2018 09:59 AM    LDL,Direct 143 (H) 08/26/2013 09:16 AM    LDL, calculated 162 (H) 03/01/2018 09:59 AM    Triglyceride 279 (H) 03/01/2018 09:59 AM    CHOL/HDL Ratio 7.5 (H) 01/20/2016 09:50 AM          Assessment/Plan:    Diagnoses and all orders for this visit:    1. Gastroenteritis    2. Dizziness  -     AMB POC PT/INR    3. Anticoagulant long-term use  -     AMB POC PT/INR    4. Nausea  -     REFERRAL TO GASTROENTEROLOGY    5. Hesitancy of micturition    6. Diarrhea, unspecified type  -     REFERRAL TO GASTROENTEROLOGY    On going problem with nausea and anorexia. Went to ED for same problem. Had EKG, CXR and labs. Diagnosed with viral illness and discharged. Has lost 7 pounds since October 2018. Unable to give urine specimen to check for UTI  Check PT/INR to make sure she is not out of range and bleeding. INR 1.6 ate greens on mery. No overt s/s of bleeding    Could be a viral gastroenteritis, however, with her ongoing problem will refer to GI. Tried to test for UTI, however, she was unable to urinate. Health Maintenance:   Health Maintenance   Topic Date Due    Shingrix Vaccine Age 49> (1 of 2) 02/07/1998    Influenza Age 5 to Adult  08/01/2018    BREAST CANCER SCRN MAMMOGRAM  03/21/2019    MEDICARE YEARLY EXAM  03/02/2019    GLAUCOMA SCREENING Q2Y  05/01/2019    COLONOSCOPY  01/01/2020    DTaP/Tdap/Td series (2 - Td) 08/14/2026    Hepatitis C Screening  Completed    Pneumococcal 65+ Low/Medium Risk  Completed    Bone Densitometry (Dexa) Screening  Addressed       I have discussed the diagnosis with the patient and the intended plan as seen in the above orders. The patient has received an After-Visit Summary and questions were answered concerning future plans.      Return to clinic if sxs persist, to ER if sxs worsen    Patient verbalized understanding to above instructions. AVS printed and given to pt. Follow-up Disposition:  Return if symptoms worsen or fail to improve. Silvia Montalvo, LISSY-BC  [address] [facility]   [address] [facility] [address]  [address]

## 2019-01-03 NOTE — PROGRESS NOTES
1. Have you been to the ER, urgent care clinic since your last visit? Hospitalized since your last visit? Yes [facility] before new year. Nausea and dizziness     2. Have you seen or consulted any other health care providers outside of the [address] [place] [address] since your last visit? Include any pap smears or colon screening. Yes Heart doctor. Chief Complaint   Patient presents with    Nausea     Dizziness and lost weight.      Visit Vitals  /47   Pulse 74   Temp (!) 94.1 °F (34.5 °C) (Oral)   Resp 18   Ht 5' 1\" (1.549 m)   Wt 94 lb 6.4 oz (42.8 kg)   SpO2 99%   BMI 17.84 kg/m²

## 2019-01-03 NOTE — PATIENT INSTRUCTIONS
Gastroenteritis: Care Instructions  Your Care Instructions    Gastroenteritis is an illness that may cause nausea, vomiting, and diarrhea. It is sometimes called \"stomach flu. \" It can be caused by bacteria or a virus. You will probably begin to feel better in 1 to 2 days. In the meantime, get plenty of rest and make sure you do not become dehydrated. Dehydration occurs when your body loses too much fluid. Follow-up care is a key part of your treatment and safety. Be sure to make and go to all appointments, and call your doctor if you are having problems. It's also a good idea to know your test results and keep a list of the medicines you take. How can you care for yourself at home? · If your doctor prescribed antibiotics, take them as directed. Do not stop taking them just because you feel better. You need to take the full course of antibiotics. · Drink plenty of fluids to prevent dehydration, enough so that your urine is light yellow or clear like water. Choose water and other caffeine-free clear liquids until you feel better. If you have kidney, heart, or liver disease and have to limit fluids, talk with your doctor before you increase your fluid intake. · Drink fluids slowly, in frequent, small amounts, because drinking too much too fast can cause vomiting. · Begin eating mild foods, such as dry toast, yogurt, applesauce, bananas, and rice. Avoid spicy, hot, or high-fat foods, and do not drink alcohol or caffeine for a day or two. Do not drink milk or eat ice cream until you are feeling better. How to prevent gastroenteritis  · Keep hot foods hot and cold foods cold. · Do not eat meats, dressings, salads, or other foods that have been kept at room temperature for more than 2 hours. · Use a thermometer to check your refrigerator. It should be between 34°F and 40°F.  · Defrost meats in the refrigerator or microwave, not on the kitchen counter. · Keep your hands and your kitchen clean.  Wash your hands, cutting boards, and countertops with hot soapy water frequently. · Cook meat until it is well done. · Do not eat raw eggs or uncooked sauces made with raw eggs. · Do not take chances. If food looks or tastes spoiled, throw it out. When should you call for help? Call 911 anytime you think you may need emergency care. For example, call if:    · You vomit blood or what looks like coffee grounds.     · You passed out (lost consciousness).     · You pass maroon or very bloody stools.    Call your doctor now or seek immediate medical care if:    · You have severe belly pain.     · You have signs of needing more fluids. You have sunken eyes, a dry mouth, and pass only a little dark urine.     · You feel like you are going to faint.     · You have increased belly pain that does not go away in 1 to 2 days.     · You have new or increased nausea, or you are vomiting.     · You have a new or higher fever.     · Your stools are black and tarlike or have streaks of blood.    Watch closely for changes in your health, and be sure to contact your doctor if:    · You are dizzy or lightheaded.     · You urinate less than usual, or your urine is dark yellow or brown.     · You do not feel better with each day that goes by. Where can you learn more? Go to http://librdao-alaina.info/. Enter N142 in the search box to learn more about \"Gastroenteritis: Care Instructions. \"  Current as of: November 18, 2017  Content Version: 11.8  © 4453-1584 [employer]. Care instructions adapted under license by [employer] (which disclaims liability or warranty for this information). If you have questions about a medical condition or this instruction, always ask your healthcare professional. [place] [address] any warranty or liability for your use of this information.

## 2019-01-09 ENCOUNTER — OFFICE VISIT (OUTPATIENT)
Dept: FAMILY MEDICINE CLINIC | Age: 69
End: 2019-01-09

## 2019-01-09 VITALS
DIASTOLIC BLOOD PRESSURE: 62 MMHG | OXYGEN SATURATION: 97 % | RESPIRATION RATE: 16 BRPM | TEMPERATURE: 96.1 F | WEIGHT: 104.2 LBS | HEIGHT: 61 IN | SYSTOLIC BLOOD PRESSURE: 112 MMHG | BODY MASS INDEX: 19.67 KG/M2 | HEART RATE: 58 BPM

## 2019-01-09 DIAGNOSIS — E78.00 HYPERCHOLESTEROLEMIA: ICD-10-CM

## 2019-01-09 DIAGNOSIS — R59.0 MEDIASTINAL ADENOPATHY: ICD-10-CM

## 2019-01-09 DIAGNOSIS — I50.9 CONGESTIVE HEART FAILURE, UNSPECIFIED HF CHRONICITY, UNSPECIFIED HEART FAILURE TYPE (HCC): ICD-10-CM

## 2019-01-09 DIAGNOSIS — E03.4 HYPOTHYROIDISM DUE TO ACQUIRED ATROPHY OF THYROID: Primary | ICD-10-CM

## 2019-01-09 DIAGNOSIS — Q85.00 NEUROFIBROMATOSIS (HCC): ICD-10-CM

## 2019-01-09 DIAGNOSIS — E55.9 VITAMIN D DEFICIENCY: ICD-10-CM

## 2019-01-09 DIAGNOSIS — I10 ESSENTIAL HYPERTENSION: ICD-10-CM

## 2019-01-09 DIAGNOSIS — I48.20 CHRONIC ATRIAL FIBRILLATION (HCC): ICD-10-CM

## 2019-01-09 DIAGNOSIS — E27.8 ADRENAL NODULE (HCC): ICD-10-CM

## 2019-01-09 DIAGNOSIS — M81.0 AGE-RELATED OSTEOPOROSIS WITHOUT CURRENT PATHOLOGICAL FRACTURE: ICD-10-CM

## 2019-01-09 NOTE — PROGRESS NOTES
Chief Complaint   Patient presents with    Follow Up Chronic Condition     6 month; patient fasting     1. Have you been to the ER, urgent care clinic since your last visit? Hospitalized since your last visit? No    2. Have you seen or consulted any other health care providers outside of the [address] [place] [address] since your last visit? Include any pap smears or colon screening.  No

## 2019-01-09 NOTE — PATIENT INSTRUCTIONS
Learning About Lung Nodules  What is a lung nodule? A lung nodule is a growth in the lung. A single nodule surrounded by lung tissue is called a solitary pulmonary nodule. A lung nodule might not cause any symptoms. Your doctor may have found one or more nodules on your lung when you were having a chest X-ray or CT scan. Or it may have been found during a lung cancer screening. A lung nodule may be caused by an old infection or cancer. It might also be a noncancerous growth. Lung nodules can cause a screening to give an abnormal result. Most nodules do not cause any harm. But without further tests, your doctor can't tell whether an abnormal finding is cancer, a harmless nodule, or something else. What can you expect when you have a lung nodule? Your doctor will look at several risk factors to see how likely it is that the nodule is cancer. He or she will look at:  · Whether you smoke or have ever smoked. · Your age and your family's medical history. · Whether you have ever had lung cancer. · The size and shape of the nodule. · Whether the nodule has changed in size. Your doctor may look at past chest X-rays or CT scans, if available, and compare them. Or you may have a series of CT scans to see if the nodule grows over time. What happens next depends on the risk of the nodule being cancer. · If you have no risk factors and the nodule is small, your doctor may advise doing nothing. · If the risk is small, your doctor may schedule follow-up appointments and tests. You may have more CT scans later to see if the nodule is growing. If the nodule hasn't changed in 3 to 6 months, you may have CT scans every year. If it hasn't changed in 2 years, you may not need any more tests. · If there's a higher risk of cancer, your doctor may:  ? Do a PET scan, which may help tell if the nodule is cancerous or not. ? Take a sample of tissue from the nodule for testing. This is called a biopsy. ?  Remove the nodule with surgery. Follow-up care is a key part of your treatment and safety. Be sure to make and go to all appointments, and call your doctor if you are having problems. It's also a good idea to know your test results and keep a list of the medicines you take. Where can you learn more? Go to http://librado-alaina.info/. Enter L427 in the search box to learn more about \"Learning About Lung Nodules. \"  Current as of: March 28, 2018  Content Version: 11.8  © 0131-6998 [employer]wise, [employer]orporated. Care instructions adapted under license by [employer] (which disclaims liability or warranty for this information). If you have questions about a medical condition or this instruction, always ask your healthcare professional. [address] any warranty or liability for your use of this information.

## 2019-01-09 NOTE — PROGRESS NOTES
Chief Complaint   Patient presents with    Follow Up Chronic Condition     6 month; patient fasting       Pt is a 77y.o. year old female who presents for follow up of her chronic medical problems    Health Maintenance Due   Topic Date Due    Shingrix Vaccine Age 49> (1 of 2) 02/07/1998-declined since her insurance does not cover this   [facility] MAMMOGRAM  03/21/2019-declined       Wt Readings from Last 3 Encounters:   01/09/19 104 lb 3.2 oz (47.3 kg)   01/03/19 94 lb 6.4 oz (42.8 kg)   10/18/18 103 lb (46.7 kg)       Cardio follow up for A fib  Coumadin dose-2 mg 4 days a week and 1 mg the rest of the days  ECHO CARDIOGRAM [facility] [employer]  Component Name Value Ref Range   EF Echo 53     Result Impression   :   LEFT VENTRICULAR SYSTOLIC FUNCTION IS LOW NORMAL. CALCULATED BY MULTANI'S BIPLANE. EJECTION   FRACTION OF 53%. ABNORMAL SEPTAL MOTION. DIASTOLIC DYSFUNCTION IS PRESENT. NORMAL RIGHT VENTRICULAR SIZE WITH LOW NORMAL RIGHT VENTRICULAR GLOBAL SYSTOLIC FUNCTION. DILATED LEFT ATRIUM. MITRAL STENOSIS IS MILD TO MODERATE. CALCULATED MITRAL VALVE AREA OF  1.5CM2. MEAN PRESSURE   GRADIENT OF  6MMHG. MILD MITRAL REGURGITATION. ESTIMATED RIGHT VENTRICULAR SYSTOLIC PRESSURE OF 02AGAL. COMPARED TO PREVIOUS ECHO ON 05/14/2018:   1. THE EJECTION FRACTION HAS IMPROVED FROM 35% TO 53% WITH AN ABNORMAL SEPTAL MOTION. 2. OTHER FINDING ARE SIMILAR. Pulmo note reviewed-seen after hospitalization for pleural effusion    \"PULMONARY CONSULTATION:   6/19/2018  REASON FOR CONSULT: Hospital Discharge Follow-up (SPO2 99% RA )  REFERRING PHYSICIAN: Olinda Watt  PRIMARY CARE PHYSICIAN: Tristen West MD  ASSESSMENT/PLAN:  1. Pleural effusion exudative, R-thora 5/2018  - CT Chest W/O Contrast; Future  2. Nodule of right lung  - CT Chest W/O Contrast; Future  R-pleural effusion thoracentesis 1.3 L 5/2018. Chest xray with resolved effusion however with RUL opacity. Fluid analysis is exudate. She is on Furosemide for CHF-EF 35%. Fluid could be pseudo-exudate due to diuresis. Has no pulmonary symptoms at this time. Never smoker but 2nd hand smoking.   -will get CT chest to eval for RUL lesion seen on chest xray. If negative, she wants me to call her with results. However told her that if abnormal CT chest, she will need to come in to discuss results. \"    CT CHEST W/O CONTRAST6/27/2018  [facility]  Result Impression   Impression:  Small right and trace left pleural effusions with associated atelectasis. There is some septal thickening which could suggest a component of superimposed interstitial edema. There is also coronary artery disease and atherosclerosis. Increasing mediastinal lymphadenopathy of uncertain etiology. Increasing isak-fissural nodularity in the lungs, most likely isak-fissural lymph nodes. Indeterminate left adrenal nodule, but stable over multiple year comparison. This is likely benign given stability. Prior cholecystectomy. Cutaneous manifestations of neurofibromatosis. \"Telephone Encounter - Marilu Peterson MD - 07/03/2018 11:34 AM EDT  do not see any abnormal nodule on lungs.  Maybe some chronic changes from yrs of 2nd hand smoking.  Has small pleural effusions- I think this is from CHF. Have routed note to cards- Dr Piyush Gutiérrez. Brendia Crumble full PFTs and repeat CT chest with IV contrast in 2 months with f/u appt with me. Had R-thora 5/2018, exudate, could be pseudo-exudate due to CHF, EF 32%  Talked to Ms Freeman Doss. Will cancel appt with me 7/17. Will reschedule for 2-3 months with me to see PFTs and CT chest with IV.  \"        Lost to follow up as she went to [place] for a few months      Lab Results   Component Value Date/Time    TSH 1.860 07/05/2018 09:25 AM   On Synthroid    Lab Results   Component Value Date/Time    Vitamin D 25-Hydroxy 7.8 (L) 01/20/2016 09:50 AM    VITAMIN D, 25-HYDROXY 6.9 (L) 03/01/2018 09:59 AM Insurance will not pay for rx Vit D so she is just taking OTC vit D    Lab Results   Component Value Date/Time    Cholesterol, total 244 (H) 03/01/2018 09:59 AM    HDL Cholesterol 26 (L) 03/01/2018 09:59 AM    LDL,Direct 143 (H) 08/26/2013 09:16 AM    LDL, calculated 162 (H) 03/01/2018 09:59 AM    VLDL, calculated 56 (H) 03/01/2018 09:59 AM    Triglyceride 279 (H) 03/01/2018 09:59 AM    CHOL/HDL Ratio 7.5 (H) 01/20/2016 09:50 AM   Intolerant to statins-hurts her bones and cannot walk when she was taking these    BONE DENSITY STUDY - CENTRAL2/10/2016  [facility]  Result Impression   Impression:    BMD MEASURES  CONSISTENT WITH OSTEOPOROSIS. Declines treatment-\"I do not have that\"    ROS:    Pt denies: Wt loss, Fever/Chills, HA, Visual changes, Fatigue, Chest pain, SOB, GONZALES, Abd pain, N/V/D/C, Blood in stool or urine, Edema. Pertinent positive as above in HPI.  All others were negative    Patient Active Problem List   Diagnosis Code    Hypercholesterolemia E78.00    Vitamin D deficiency E55.9    Incidental lung nodule, less than or equal to 3mm R91.1    Adrenal nodule ([address] E27.9    Hypothyroidism due to acquired atrophy of thyroid E03.4    Renal cyst N28.1    Advance directive discussed with patient Z71.89    Chronic atrial fibrillation ([address] I48.2    Neurofibromatosis ([facility] Q85.00    Age-related osteoporosis without current pathological fracture M81.0    Essential hypertension I10    Non-rheumatic mitral valve stenosis I34.2    Congestive heart failure (HCC) I50.9    Mediastinal lymphadenopathy R59.0    Anticoagulant long-term use Z79.01       Past Medical History:   Diagnosis Date    A-fib ([address]     Arrhythmia     atrial fibrillation/has mitral valve disease from hx of rheumatic fever    Contact dermatitis and other eczema, due to unspecified cause     Headache(784.0)     hx migraines    Hypercholesterolemia     Hypertension     Neurofibromatosis, unspecified     Renal mass 11/6/2012    Thyroid disease        Current Outpatient Medications   Medication Sig Dispense Refill    levothyroxine (SYNTHROID) 50 mcg tablet TAKE 1 TABLET EVERY DAY BEFORE BREAKFAST 90 Tab 3    ferrous sulfate 325 mg (65 mg iron) tablet Take 325 mg by mouth daily.  furosemide (LASIX) 40 mg tablet Take 60 mg by mouth daily.  metoprolol tartrate (LOPRESSOR) 100 mg IR tablet Take 100 mg by mouth two (2) times a day.  potassium chloride SR (KLOR-CON 10) 10 mEq tablet Take 1 Tab by mouth daily. 90 Tab 3    warfarin (COUMADIN) 2 mg tablet Take 2 mg by mouth daily. Social History     Tobacco Use   Smoking Status Never Smoker   Smokeless Tobacco Never Used       Allergies   Allergen Reactions    Codeine Nausea Only    Statins-Hmg-Coa Reductase Inhibitors Other (comments)     Leg pains, bone pains       Patient Labs were reviewed: yes      Patient Past Records were reviewed:  yes        Objective:     Vitals:    01/09/19 0842   BP: 112/62   Pulse: (!) 58   Resp: 16   Temp: 96.1 °F (35.6 °C)   TempSrc: Oral   SpO2: 97%   Weight: 104 lb 3.2 oz (47.3 kg)   Height: 5' 1\" (1.549 m)     Body mass index is 19.69 kg/m². Exam:   Appearance: alert, well appearing,  oriented to person, place, and time, acyanotic, in no respiratory distress and well hydrated. HEENT:  NC/AT, pink conj, anicteric sclerae  Neck:  No cervical lymphadenopathy, no JVD, no thyromegaly, no carotid bruit  Heart: irregularly irregular rhythm  Lungs:  CTAB, no rhonchi, rales, or wheezes with good air exchange   Abdomen:  Non-tender, pos bowel sounds, no hepatosplenomegaly  Ext:  No C/C/E    Skin: no rash, neurofibromas all over  Neuro: no lateralizing signs, CNs II-XII intact      Assessment/ Plan:   Diagnoses and all orders for this visit:    1. Hypothyroidism due to acquired atrophy of thyroid-on Synthroid, will adjust dose accordingly  -     TSH 3RD GENERATION; Future    2.  Mediastinal adenopathy-given phone number to call Pulmo for follow up re need for repeat CT with contrast and PFTs    3. Adrenal nodule (HCC)-Ct images stable, patient reassured; prev saw endo for this    4. Essential hypertension-controlled, continue with Lopressor, Lasix  -     CBC WITH AUTOMATED DIFF; Future  -     METABOLIC PANEL, COMPREHENSIVE; Future    5. Hypercholesterolemia-intolerant to statins, discussed low cholesterol diet  -     LIPID PANEL; Future    6. Neurofibromatosis (HCC)-asymptomatic    7. Congestive heart failure, unspecified HF chronicity, unspecified heart failure type ([address]    8. Vitamin D deficiency-on high dose OTC vit d-advised tot gregory this daily, also advised on sun exposure  -     VITAMIN D, 25 HYDROXY; Future    9. Chronic atrial fibrillation (HCC)-continue with Coumadin, Lopressor    10. Age-related osteoporosis without current pathological fracture-declined tx; continue with daily ca+D, advised to walk for exercise        Follow-up Disposition:  Return in about 6 months (around 7/9/2019) for follow up. I have discussed the diagnosis with the patient and the intended plan as seen in the above orders. The patient has received an After-Visit Summary and questions were answered concerning future plans. Medication Side Effects and Warnings were discussed with patient: yes    Patient verbalized understanding of above instructions.     Delisa Taylor MD  Internal Medicine  [facility]

## 2019-01-10 LAB
25(OH)D3+25(OH)D2 SERPL-MCNC: 11.9 NG/ML (ref 30–100)
ALBUMIN SERPL-MCNC: 3.9 G/DL (ref 3.5–4.8)
ALBUMIN/GLOB SERPL: 1.6 {RATIO} (ref 1.2–2.2)
ALP SERPL-CCNC: 116 IU/L (ref 39–117)
ALT SERPL-CCNC: 8 IU/L (ref 0–32)
AST SERPL-CCNC: 12 IU/L (ref 0–40)
BASOPHILS # BLD AUTO: 0 X10E3/UL (ref 0–0.2)
BASOPHILS NFR BLD AUTO: 0 %
BILIRUB SERPL-MCNC: 0.4 MG/DL (ref 0–1.2)
BUN SERPL-MCNC: 8 MG/DL (ref 8–27)
BUN/CREAT SERPL: 12 (ref 12–28)
CALCIUM SERPL-MCNC: 8.9 MG/DL (ref 8.7–10.3)
CHLORIDE SERPL-SCNC: 101 MMOL/L (ref 96–106)
CHOLEST SERPL-MCNC: 201 MG/DL (ref 100–199)
CO2 SERPL-SCNC: 25 MMOL/L (ref 20–29)
CREAT SERPL-MCNC: 0.66 MG/DL (ref 0.57–1)
EOSINOPHIL # BLD AUTO: 0.5 X10E3/UL (ref 0–0.4)
EOSINOPHIL NFR BLD AUTO: 6 %
ERYTHROCYTE [DISTWIDTH] IN BLOOD BY AUTOMATED COUNT: 15.1 % (ref 12.3–15.4)
GLOBULIN SER CALC-MCNC: 2.5 G/DL (ref 1.5–4.5)
GLUCOSE SERPL-MCNC: 101 MG/DL (ref 65–99)
HCT VFR BLD AUTO: 32.5 % (ref 34–46.6)
HDLC SERPL-MCNC: 30 MG/DL
HGB BLD-MCNC: 10.8 G/DL (ref 11.1–15.9)
IMM GRANULOCYTES # BLD AUTO: 0 X10E3/UL (ref 0–0.1)
IMM GRANULOCYTES NFR BLD AUTO: 0 %
INTERPRETATION, 910389: NORMAL
LDLC SERPL CALC-MCNC: 140 MG/DL (ref 0–99)
LYMPHOCYTES # BLD AUTO: 0.9 X10E3/UL (ref 0.7–3.1)
LYMPHOCYTES NFR BLD AUTO: 10 %
MCH RBC QN AUTO: 26.5 PG (ref 26.6–33)
MCHC RBC AUTO-ENTMCNC: 33.2 G/DL (ref 31.5–35.7)
MCV RBC AUTO: 80 FL (ref 79–97)
MONOCYTES # BLD AUTO: 0.8 X10E3/UL (ref 0.1–0.9)
MONOCYTES NFR BLD AUTO: 10 %
NEUTROPHILS # BLD AUTO: 6.5 X10E3/UL (ref 1.4–7)
NEUTROPHILS NFR BLD AUTO: 74 %
PLATELET # BLD AUTO: 241 X10E3/UL (ref 150–379)
POTASSIUM SERPL-SCNC: 4.1 MMOL/L (ref 3.5–5.2)
PROT SERPL-MCNC: 6.4 G/DL (ref 6–8.5)
RBC # BLD AUTO: 4.07 X10E6/UL (ref 3.77–5.28)
SODIUM SERPL-SCNC: 141 MMOL/L (ref 134–144)
TRIGL SERPL-MCNC: 153 MG/DL (ref 0–149)
TSH SERPL DL<=0.005 MIU/L-ACNC: 1.64 UIU/ML (ref 0.45–4.5)
VLDLC SERPL CALC-MCNC: 31 MG/DL (ref 5–40)
WBC # BLD AUTO: 8.8 X10E3/UL (ref 3.4–10.8)

## 2019-07-31 RX ORDER — LEVOTHYROXINE SODIUM 50 UG/1
TABLET ORAL
Qty: 90 TAB | Refills: 3 | Status: SHIPPED | OUTPATIENT
Start: 2019-07-31 | End: 2020-03-09 | Stop reason: SDUPTHER

## 2019-10-01 ENCOUNTER — OFFICE VISIT (OUTPATIENT)
Dept: FAMILY MEDICINE CLINIC | Age: 69
End: 2019-10-01

## 2019-10-01 VITALS
BODY MASS INDEX: 20.2 KG/M2 | HEART RATE: 56 BPM | RESPIRATION RATE: 15 BRPM | OXYGEN SATURATION: 96 % | SYSTOLIC BLOOD PRESSURE: 106 MMHG | WEIGHT: 107 LBS | DIASTOLIC BLOOD PRESSURE: 55 MMHG | TEMPERATURE: 95.2 F | HEIGHT: 61 IN

## 2019-10-01 DIAGNOSIS — E78.00 HYPERCHOLESTEROLEMIA: ICD-10-CM

## 2019-10-01 DIAGNOSIS — Q85.00 NEUROFIBROMATOSIS (HCC): ICD-10-CM

## 2019-10-01 DIAGNOSIS — E55.9 VITAMIN D DEFICIENCY: ICD-10-CM

## 2019-10-01 DIAGNOSIS — R30.0 DYSURIA: ICD-10-CM

## 2019-10-01 DIAGNOSIS — R11.0 NAUSEA: ICD-10-CM

## 2019-10-01 DIAGNOSIS — R26.81 GAIT INSTABILITY: ICD-10-CM

## 2019-10-01 DIAGNOSIS — I10 ESSENTIAL HYPERTENSION: ICD-10-CM

## 2019-10-01 DIAGNOSIS — Z00.00 MEDICARE ANNUAL WELLNESS VISIT, SUBSEQUENT: Primary | ICD-10-CM

## 2019-10-01 DIAGNOSIS — I48.20 CHRONIC ATRIAL FIBRILLATION (HCC): ICD-10-CM

## 2019-10-01 DIAGNOSIS — M81.0 AGE-RELATED OSTEOPOROSIS WITHOUT CURRENT PATHOLOGICAL FRACTURE: ICD-10-CM

## 2019-10-01 DIAGNOSIS — E03.4 HYPOTHYROIDISM DUE TO ACQUIRED ATROPHY OF THYROID: ICD-10-CM

## 2019-10-01 DIAGNOSIS — Z71.89 ADVANCED DIRECTIVES, COUNSELING/DISCUSSION: ICD-10-CM

## 2019-10-01 NOTE — PROGRESS NOTES
Chief Complaint   Patient presents with    Nausea     x1 month    Dizziness     x1 month    Annual Wellness Visit       Pt is a 68y.o. year old female who presents for follow up of her chronic medical problems/annual wellness visit    1 month of feeling nauseated-not related to food  Dizzy all the time; walks sideways  Feels like she is going to fall forwards    Everything she eats is fried says   Gets choked when she eats  Hx of GB out bec of gallstones      Saw Cardio 8/2019-A fib, Echo ordered for Oct; no med changes  Still on Coumadin    BP Readings from Last 3 Encounters:   10/01/19 106/55   01/09/19 112/62   01/03/19 105/47       Not fasting today        ROS:    Pt denies: Wt loss, Fever/Chills, HA, Visual changes, Fatigue, Chest pain, SOB, GONZALES, Abd pain, N/V/D/C, Blood in stool or urine, Edema. Pertinent positive as above in HPI.  All others were negative    Patient Active Problem List   Diagnosis Code    Hypercholesterolemia E78.00    Vitamin D deficiency E55.9    Incidental lung nodule, less than or equal to 3mm R91.1    Adrenal nodule ([address] E27.9    Hypothyroidism due to acquired atrophy of thyroid E03.4    Renal cyst N28.1    Advance directive discussed with patient Z71.89    Chronic atrial fibrillation I48.20    Neurofibromatosis ([address] Q85.00    Age-related osteoporosis without current pathological fracture M81.0    Essential hypertension I10    Non-rheumatic mitral valve stenosis I34.2    Congestive heart failure (HCC) I50.9    Mediastinal lymphadenopathy R59.0    Anticoagulant long-term use Z79.01       Past Medical History:   Diagnosis Date    A-fib ([address]     Arrhythmia     atrial fibrillation/has mitral valve disease from hx of rheumatic fever    Contact dermatitis and other eczema, due to unspecified cause     Headache(784.0)     hx migraines    Hypercholesterolemia     Hypertension     Neurofibromatosis, unspecified     Renal mass 11/6/2012    Thyroid disease Current Outpatient Medications   Medication Sig Dispense Refill    levothyroxine (SYNTHROID) 50 mcg tablet TAKE 1 TABLET EVERY DAY BEFORE BREAKFAST 90 Tab 3    ferrous sulfate 325 mg (65 mg iron) tablet Take 325 mg by mouth daily.  furosemide (LASIX) 40 mg tablet Take 60 mg by mouth daily.  metoprolol tartrate (LOPRESSOR) 100 mg IR tablet Take 100 mg by mouth two (2) times a day.  potassium chloride SR (KLOR-CON 10) 10 mEq tablet Take 1 Tab by mouth daily. 90 Tab 3    warfarin (COUMADIN) 2 mg tablet Take 2 mg by mouth daily. Social History     Tobacco Use   Smoking Status Never Smoker   Smokeless Tobacco Never Used       Allergies   Allergen Reactions    Codeine Nausea Only    Statins-Hmg-Coa Reductase Inhibitors Other (comments)     Leg pains, bone pains       Patient Labs were reviewed: yes      Patient Past Records were reviewed:  yes        Objective:     Vitals:    10/01/19 0937   BP: 106/55   Pulse: (!) 56   Resp: 15   Temp: 95.2 °F (35.1 °C)   TempSrc: Oral   SpO2: 96%   Weight: 107 lb (48.5 kg)   Height: 5' 1\" (1.549 m)     Body mass index is 20.22 kg/m². Exam:   Appearance: alert, well appearing,  oriented to person, place, and time, acyanotic, in no respiratory distress and well hydrated. HEENT:  NC/AT, pink conj, anicteric sclerae  Neck:  No cervical lymphadenopathy, no JVD, no thyromegaly, no carotid bruit  Heart:  Irregularly irregular rhythm  Lungs:  CTAB, no rhonchi, rales, or wheezes with good air exchange   Abdomen:  Non-tender, pos bowel sounds, no hepatosplenomegaly  Ext:  No C/C/E    Skin: no rash, neurofibromas  Neuro: CNs II-XII intact, unable to do heel to toe walking      Assessment/ Plan:   Diagnoses and all orders for this visit:    1. Medicare annual wellness visit, subsequent-see note below    2. Hypothyroidism due to acquired atrophy of thyroid-on Synthroid  -     TSH 3RD GENERATION; Future    3.  Essential hypertension-controlled, continue with present meds  -     CBC WITH AUTOMATED DIFF; Future  -     METABOLIC PANEL, COMPREHENSIVE; Future    4. Hypercholesterolemia-not on statins/declines  -     LIPID PANEL; Future  Lab Results   Component Value Date/Time    Cholesterol, total 232 (H) 10/01/2019 10:41 AM    HDL Cholesterol 27 (L) 10/01/2019 10:41 AM    LDL,Direct 143 (H) 08/26/2013 09:16 AM    LDL, calculated 168 (H) 10/01/2019 10:41 AM    VLDL, calculated 37 10/01/2019 10:41 AM    Triglyceride 187 (H) 10/01/2019 10:41 AM    CHOL/HDL Ratio 7.5 (H) 01/20/2016 09:50 AM     5. Neurofibromatosis ([facility]    6. Age-related osteoporosis without current pathological fracture-declines tx    7. Chronic atrial fibrillation-on Coumadin; Cardio following    8. Vitamin D deficiency  -     VITAMIN D, 25 HYDROXY; Future    9. Gait instability-etio? Need to rule out CVA, NPH  -     MRI BRAIN WO CONT; Future  -     VITAMIN B12; Future    10. Nausea-etio? Advised to avoid fried foods  -     CULTURE, URINE; Future    11. Dysuria  -     CULTURE, URINE; Future      Follow-up and Dispositions    · Return in about 6 months (around 4/1/2020) for follow up/sooner if sxs persist or worsen             I have discussed the diagnosis with the patient and the intended plan as seen in the above orders. The patient has received an After-Visit Summary and questions were answered concerning future plans. Medication Side Effects and Warnings were discussed with patient: yes    Patient verbalized understanding of above instructions. Emeli Ghotra MD  Internal Medicine  [facility]    This is the Subsequent Medicare Annual Wellness Exam, performed 12 months or more after the Initial AWV or the last Subsequent AWV    I have reviewed the patient's medical history in detail and updated the computerized patient record.      History     Past Medical History:   Diagnosis Date    A-fib [facility])     Arrhythmia     atrial fibrillation/has mitral valve disease from hx of rheumatic fever    Contact dermatitis and other eczema, due to unspecified cause     Headache(784.0)     hx migraines    Hypercholesterolemia     Hypertension     Neurofibromatosis, unspecified     Renal mass 11/6/2012    Thyroid disease       Past Surgical History:   Procedure Laterality Date    ENDOSCOPY, COLON, DIAGNOSTIC      colon surgery lg & sm intestine    HX CHOLECYSTECTOMY      HX GI      for neurofibromatosis    HX GYN      hysterectomy for bleeding     Current Outpatient Medications   Medication Sig Dispense Refill    levothyroxine (SYNTHROID) 50 mcg tablet TAKE 1 TABLET EVERY DAY BEFORE BREAKFAST 90 Tab 3    ferrous sulfate 325 mg (65 mg iron) tablet Take 325 mg by mouth daily.  furosemide (LASIX) 40 mg tablet Take 60 mg by mouth daily.  metoprolol tartrate (LOPRESSOR) 100 mg IR tablet Take 100 mg by mouth two (2) times a day.  potassium chloride SR (KLOR-CON 10) 10 mEq tablet Take 1 Tab by mouth daily. 90 Tab 3    warfarin (COUMADIN) 2 mg tablet Take 2 mg by mouth daily.          Allergies   Allergen Reactions    Codeine Nausea Only    Statins-Hmg-Coa Reductase Inhibitors Other (comments)     Leg pains, bone pains     Family History   Problem Relation Age of Onset    Hypertension Mother     Cancer Sister         APPENDIX    Cancer Brother         metastic cancer in remission     Cancer Sister         \"it hardened her arteries\"   [facility] Cancer Sister         colon     Cancer Sister    [facility] Cancer Sister      Social History     Tobacco Use    Smoking status: Never Smoker    Smokeless tobacco: Never Used   Substance Use Topics    Alcohol use: No     Patient Active Problem List   Diagnosis Code    Hypercholesterolemia E78.00    Vitamin D deficiency E55.9    Incidental lung nodule, less than or equal to 3mm R91.1    Adrenal nodule ([address] E27.9    Hypothyroidism due to acquired atrophy of thyroid E03.4    Renal cyst N28.1    Advance directive discussed with patient Z71.89  Chronic atrial fibrillation I48.20    Neurofibromatosis ([facility] [address] Q85.00    Age-related osteoporosis without current pathological fracture M81.0    Essential hypertension I10    Non-rheumatic mitral valve stenosis I34.2    Congestive heart failure (HCC) I50.9    Mediastinal lymphadenopathy R59.0    Anticoagulant long-term use Z79.01       Depression Risk Factor Screening:     3 most recent PHQ Screens 10/18/2018   Little interest or pleasure in doing things Not at all   Feeling down, depressed, irritable, or hopeless Not at all   Total Score PHQ 2 0     Alcohol Risk Factor Screening: You do not drink alcohol or very rarely. Functional Ability and Level of Safety:   Hearing Loss  Hearing is good. Activities of Daily Living  The home contains: no safety equipment. Patient does total self care    Fall Risk  Fall Risk Assessment, last 12 mths 10/18/2018   Able to walk? Yes   Fall in past 12 months? No   Fall with injury? -   Number of falls in past 12 months -   Fall Risk Score -       Abuse Screen  Patient is not abused    Cognitive Screening   Evaluation of Cognitive Function:  Has your family/caregiver stated any concerns about your memory: no  Normal clock drawing test    Patient Care Team   Patient Care Team:  Brad Arndt MD as PCP - General (Internal Medicine)  Marisela Zheng MD (Cardiology)    Assessment/Plan   Education and counseling provided:  Are appropriate based on today's review and evaluation  End-of-Life planning (with patient's consent) -see ACP note  Pneumococcal Vaccine-done  Influenza Vaccine-wants to wait  Screening Mammography-declined  Colorectal cancer screening tests-up to date  Cardiovascular screening blood test- lipids today  Bone mass measurement (DEXA)-osteoporosis/declines treatment  Screening for glaucoma-done/she will get a copy  Diabetes screening test-RBS today    Diagnoses and all orders for this visit:    1.  Medicare annual wellness visit, subsequent-Refer to above for plan and to patient instructions for recommendations on HM    2.  Advanced directives, counseling/discussion      Health Maintenance Due   Topic Date Due    MEDICARE YEARLY EXAM -today 03/02/2019    BREAST CANCER SCRN MAMMOGRAM -declined 03/21/2019    GLAUCOMA SCREENING Q2Y -wears eyeglasses/will obtain copy 05/01/2019    Influenza Age 5 to Adult -wants to wait 08/01/2019    COLONOSCOPY  01/01/2020     RTC yearly for wellness visit

## 2019-10-01 NOTE — PATIENT INSTRUCTIONS
Medicare Wellness Visit, Female The best way to live healthy is to have a lifestyle where you eat a well-balanced diet, exercise regularly, limit alcohol use, and quit all forms of tobacco/nicotine, if applicable. Regular preventive services are another way to keep healthy. Preventive services (vaccines, screening tests, monitoring & exams) can help personalize your care plan, which helps you manage your own care. Screening tests can find health problems at the earliest stages, when they are easiest to treat. [facility] follows the current, evidence-based guidelines published by the [facility] ([facility]) when recommending preventive services for our patients. Because we follow these guidelines, sometimes recommendations change over time as research supports it. (For example, mammograms used to be recommended annually. Even though Medicare will still pay for an annual mammogram, the newer guidelines recommend a mammogram every two years for women of average risk.) Of course, you and your doctor may decide to screen more often for some diseases, based on your risk and your health status. Preventive services for you include: - Medicare offers their members a free annual wellness visit, which is time for you and your primary care provider to discuss and plan for your preventive service needs. Take advantage of this benefit every year! 
-All adults over the age of 72 should receive the recommended pneumonia vaccines. Current USPSTF guidelines recommend a series of two vaccines for the best pneumonia protection.  
-All adults should have a flu vaccine yearly and a tetanus vaccine every 10 years. All adults age 61 and older should receive a shingles vaccine once in their lifetime.   
-A bone mass density test is recommended when a woman turns 65 to screen for osteoporosis. This test is only recommended one time, as a screening. Some providers will use this same test as a disease monitoring tool if you already have osteoporosis. -All adults age 38-68 who are overweight should have a diabetes screening test once every three years.  
-Other screening tests and preventive services for persons with diabetes include: an eye exam to screen for diabetic retinopathy, a kidney function test, a foot exam, and stricter control over your cholesterol.  
-Cardiovascular screening for adults with routine risk involves an electrocardiogram (ECG) at intervals determined by your doctor.  
-Colorectal cancer screenings should be done for adults age 54-65 with no increased risk factors for colorectal cancer. There are a number of acceptable methods of screening for this type of cancer. Each test has its own benefits and drawbacks. Discuss with your doctor what is most appropriate for you during your annual wellness visit. The different tests include: colonoscopy (considered the best screening method), a fecal occult blood test, a fecal DNA test, and sigmoidoscopy. -Breast cancer screenings are recommended every other year for women of normal risk, age 54-69. 
-Cervical cancer screenings for women over age 72 are only recommended with certain risk factors.  
-All adults born between [facility] should be screened once for Hepatitis C. Here is a list of your current Health Maintenance items (your personalized list of preventive services) with a due date: 
Health Maintenance Due Topic Date Due  
 Annual Well Visit  03/02/2019  Mammogram  03/21/2019  Glaucoma Screening   05/01/2019  Flu Vaccine  08/01/2019  Colonoscopy  01/01/2020

## 2019-10-01 NOTE — PROGRESS NOTES
Chief Complaint   Patient presents with    Nausea     x1 month    Dizziness     x1 month    Annual Wellness Visit       1. Have you been to the ER, urgent care clinic since your last visit? Hospitalized since your last visit? No    2. Have you seen or consulted any other health care providers outside of the [address] [place] [address] since your last visit? Include any pap smears or colon screening.  No

## 2019-10-03 LAB
25(OH)D3+25(OH)D2 SERPL-MCNC: 11.8 NG/ML (ref 30–100)
ALBUMIN SERPL-MCNC: 4.1 G/DL (ref 3.5–4.8)
ALBUMIN/GLOB SERPL: 1.9 {RATIO} (ref 1.2–2.2)
ALP SERPL-CCNC: 139 IU/L (ref 39–117)
ALT SERPL-CCNC: 13 IU/L (ref 0–32)
AST SERPL-CCNC: 18 IU/L (ref 0–40)
BACTERIA UR CULT: ABNORMAL
BASOPHILS # BLD AUTO: 0 X10E3/UL (ref 0–0.2)
BASOPHILS NFR BLD AUTO: 0 %
BILIRUB SERPL-MCNC: 0.5 MG/DL (ref 0–1.2)
BUN SERPL-MCNC: 8 MG/DL (ref 8–27)
BUN/CREAT SERPL: 11 (ref 12–28)
CALCIUM SERPL-MCNC: 8.9 MG/DL (ref 8.7–10.3)
CHLORIDE SERPL-SCNC: 102 MMOL/L (ref 96–106)
CHOLEST SERPL-MCNC: 232 MG/DL (ref 100–199)
CO2 SERPL-SCNC: 23 MMOL/L (ref 20–29)
CREAT SERPL-MCNC: 0.72 MG/DL (ref 0.57–1)
EOSINOPHIL # BLD AUTO: 0.1 X10E3/UL (ref 0–0.4)
EOSINOPHIL NFR BLD AUTO: 1 %
ERYTHROCYTE [DISTWIDTH] IN BLOOD BY AUTOMATED COUNT: 14.8 % (ref 12.3–15.4)
GLOBULIN SER CALC-MCNC: 2.2 G/DL (ref 1.5–4.5)
GLUCOSE SERPL-MCNC: 95 MG/DL (ref 65–99)
HCT VFR BLD AUTO: 33 % (ref 34–46.6)
HDLC SERPL-MCNC: 27 MG/DL
HGB BLD-MCNC: 10.6 G/DL (ref 11.1–15.9)
IMM GRANULOCYTES # BLD AUTO: 0 X10E3/UL (ref 0–0.1)
IMM GRANULOCYTES NFR BLD AUTO: 0 %
INTERPRETATION, 910389: NORMAL
LDLC SERPL CALC-MCNC: 168 MG/DL (ref 0–99)
LYMPHOCYTES # BLD AUTO: 0.7 X10E3/UL (ref 0.7–3.1)
LYMPHOCYTES NFR BLD AUTO: 6 %
MCH RBC QN AUTO: 25.4 PG (ref 26.6–33)
MCHC RBC AUTO-ENTMCNC: 32.1 G/DL (ref 31.5–35.7)
MCV RBC AUTO: 79 FL (ref 79–97)
MONOCYTES # BLD AUTO: 1 X10E3/UL (ref 0.1–0.9)
MONOCYTES NFR BLD AUTO: 9 %
NEUTROPHILS # BLD AUTO: 9.4 X10E3/UL (ref 1.4–7)
NEUTROPHILS NFR BLD AUTO: 84 %
PLATELET # BLD AUTO: 262 X10E3/UL (ref 150–450)
POTASSIUM SERPL-SCNC: 4.6 MMOL/L (ref 3.5–5.2)
PROT SERPL-MCNC: 6.3 G/DL (ref 6–8.5)
RBC # BLD AUTO: 4.18 X10E6/UL (ref 3.77–5.28)
SODIUM SERPL-SCNC: 140 MMOL/L (ref 134–144)
TRIGL SERPL-MCNC: 187 MG/DL (ref 0–149)
TSH SERPL DL<=0.005 MIU/L-ACNC: 1.57 UIU/ML (ref 0.45–4.5)
VIT B12 SERPL-MCNC: 203 PG/ML (ref 232–1245)
VLDLC SERPL CALC-MCNC: 37 MG/DL (ref 5–40)
WBC # BLD AUTO: 11.4 X10E3/UL (ref 3.4–10.8)

## 2019-10-07 NOTE — ACP (ADVANCE CARE PLANNING)
Advance Care Planning (ACP) Provider Conversation Snapshot    Date of ACP Conversation: 10/01/19  Persons included in Conversation:  patient  Length of ACP Conversation in minutes:  <16 minutes (Non-Billable)    Authorized Decision Maker (if patient is incapable of making informed decisions):    This person is:   her  Jem Jalloh            For Patients with Decision Making Capacity:   Values/Goals: Exploration of values, goals, and preferences if recovery is not expected, even with continued medical treatment in the event of:  Imminent death  Severe, permanent brain injury    Conversation Outcomes / Follow-Up Plan:   Reviewed existing Advance Directive

## 2019-10-08 DIAGNOSIS — N39.0 URINARY TRACT INFECTION WITHOUT HEMATURIA, SITE UNSPECIFIED: Primary | ICD-10-CM

## 2019-10-08 RX ORDER — SULFAMETHOXAZOLE AND TRIMETHOPRIM 800; 160 MG/1; MG/1
1 TABLET ORAL 2 TIMES DAILY
Qty: 14 TAB | Refills: 0 | Status: CANCELLED | OUTPATIENT
Start: 2019-10-08 | End: 2019-10-15

## 2019-10-08 NOTE — PROGRESS NOTES
pls let patient know-urine grew bacteria so I will send antibiotic to her pharmacy and she needs to take OTC B12 daily as it is low  Vit d is also still low so double up on dose of OTC Vit D she is taking  UTI is probably causing her nausea

## 2019-10-10 ENCOUNTER — TELEPHONE (OUTPATIENT)
Dept: FAMILY MEDICINE CLINIC | Age: 69
End: 2019-10-10

## 2019-10-10 DIAGNOSIS — N39.0 URINARY TRACT INFECTION WITHOUT HEMATURIA, SITE UNSPECIFIED: Primary | ICD-10-CM

## 2019-10-10 RX ORDER — NITROFURANTOIN 25; 75 MG/1; MG/1
100 CAPSULE ORAL 2 TIMES DAILY
Qty: 14 CAP | Refills: 0 | Status: SHIPPED | OUTPATIENT
Start: 2019-10-10 | End: 2019-10-17

## 2019-10-10 NOTE — TELEPHONE ENCOUNTER
Pt returning call, she said it's ok to leave a detailed message with lab result if unable to reach her.   Call her back at [phone]

## 2019-10-10 NOTE — TELEPHONE ENCOUNTER
----- Message from James Young MD sent at 10/8/2019 12:50 PM EDT -----  pls let patient know-urine grew bacteria so I will send antibiotic to her pharmacy and she needs to take OTC B12 daily as it is low  Vit d is also still low so double up on dose of OTC Vit D she is taking  UTI is probably causing her nausea

## 2019-10-24 DIAGNOSIS — R26.81 GAIT INSTABILITY: Primary | ICD-10-CM

## 2019-12-05 DIAGNOSIS — R26.81 GAIT INSTABILITY: ICD-10-CM

## 2020-02-26 ENCOUNTER — OFFICE VISIT (OUTPATIENT)
Dept: FAMILY MEDICINE CLINIC | Age: 70
End: 2020-02-26

## 2020-02-26 ENCOUNTER — TELEPHONE (OUTPATIENT)
Dept: FAMILY MEDICINE CLINIC | Age: 70
End: 2020-02-26

## 2020-02-26 VITALS
HEART RATE: 54 BPM | RESPIRATION RATE: 15 BRPM | SYSTOLIC BLOOD PRESSURE: 94 MMHG | DIASTOLIC BLOOD PRESSURE: 52 MMHG | WEIGHT: 107.2 LBS | HEIGHT: 61 IN | OXYGEN SATURATION: 96 % | TEMPERATURE: 95.2 F | BODY MASS INDEX: 20.24 KG/M2

## 2020-02-26 DIAGNOSIS — I48.20 CHRONIC ATRIAL FIBRILLATION (HCC): ICD-10-CM

## 2020-02-26 DIAGNOSIS — I10 ESSENTIAL HYPERTENSION: ICD-10-CM

## 2020-02-26 DIAGNOSIS — Z01.818 PRE-OPERATIVE CLEARANCE: Primary | ICD-10-CM

## 2020-02-26 DIAGNOSIS — E03.4 HYPOTHYROIDISM DUE TO ACQUIRED ATROPHY OF THYROID: ICD-10-CM

## 2020-02-26 DIAGNOSIS — Q85.00 NEUROFIBROMATOSIS (HCC): ICD-10-CM

## 2020-02-26 DIAGNOSIS — H25.9 SENILE CATARACT OF LEFT EYE, UNSPECIFIED AGE-RELATED CATARACT TYPE: ICD-10-CM

## 2020-02-26 DIAGNOSIS — E27.8 ADRENAL NODULE (HCC): ICD-10-CM

## 2020-02-26 PROBLEM — I50.9 CONGESTIVE HEART FAILURE (HCC): Status: RESOLVED | Noted: 2018-07-05 | Resolved: 2020-02-26

## 2020-02-26 NOTE — PATIENT INSTRUCTIONS
Cataract Surgery: Before Your Surgery What is cataract surgery? Cataracts are cloudy areas in the lens of your eye. Your lens is behind the colored part of your eye (iris). Its job is to focus light onto the back of your eye. In some people, cataracts prevent light from reaching the back of the eye. This can cause vision problems. Cataract surgery helps you see better. It replaces your natural lens, which has become cloudy, with a clear artificial one. There are two types of cataract surgery. Phacoemulsification (say \"hedr-em-nj-xed-xrh-sxh-MICHOACANO-shun\") is the most common type. The doctor makes a small cut in your eye. This cut is called an incision. The doctor uses a special ultrasound tool to break your cloudy lens apart. Sometimes a laser is used too. Then he or she removes the small pieces of the lens through the incision. In most cases, the doctor then inserts an artificial lens through the incision. Most people do not need stitches, because the incision is so small. If the doctor is not able to put in an artificial lens, you can wear a contact lens or thick glasses in place of your natural lens. Extracapsular extraction is a less common type of cataract surgery. The doctor makes a larger incision to remove the whole lens at once. After the doctor removes the lens, he or she stitches up the incision. Recovery from this type of surgery takes longer. Before either surgery, the doctor puts numbing drops in your eye. Some doctors use a shot instead. You may also get medicine to make you feel relaxed. You probably will not feel much pain. The surgery takes about 20 to 40 minutes. After surgery, you may have a bandage or shield on your eye. You will probably go home from surgery after 1 hour in the recovery room. Most people see better in 1 to 3 days. You may be able to go back to work or your normal routine in a few days.  It could take 3 to 10 weeks for your eye to completely heal. After your eye heals, you may still need to wear glasses, especially for reading. Follow-up care is a key part of your treatment and safety. Be sure to make and go to all appointments, and call your doctor if you are having problems. It's also a good idea to know your test results and keep a list of the medicines you take. What happens before surgery? 
 Surgery can be stressful. This information will help you understand what you can expect. And it will help you safely prepare for surgery. 
 Preparing for surgery 
  · Understand exactly what surgery is planned, along with the risks, benefits, and other options. · Tell your doctors ALL the medicines, vitamins, supplements, and herbal remedies you take. Some of these can increase the risk of bleeding or interact with anesthesia.  
  · If you take blood thinners, such as warfarin (Coumadin), clopidogrel (Plavix), or aspirin, be sure to talk to your doctor. He or she will tell you if you should stop taking these medicines before your surgery. Make sure that you understand exactly what your doctor wants you to do.  
  · Your doctor will tell you which medicines to take or stop before your surgery. You may need to stop taking certain medicines a week or more before surgery. So talk to your doctor as soon as you can.  
  · If you have an advance directive, let your doctor know. It may include a living will and a durable power of  for health care. Bring a copy to the hospital. If you don't have one, you may want to prepare one. It lets your doctor and loved ones know your health care wishes. Doctors advise that everyone prepare these papers before any type of surgery or procedure. What happens on the day of surgery? · Follow the instructions exactly about when to stop eating and drinking. If you don't, your surgery may be canceled.  If your doctor told you to take your medicines on the day of surgery, take them with only a sip of water.  
  · Take a bath or shower before you come in for your surgery. Do not apply lotions, perfumes, deodorants, or nail polish.  
  · Take off all jewelry and piercings. And take out contact lenses, if you wear them.  
 At the hospital or surgery center · Bring a picture ID.  
  · The area for surgery is often marked to make sure there are no errors.  
  · You will be kept comfortable and safe by your anesthesia provider. The anesthesia may make you sleep. Or it may just numb the area being worked on.  
  · The surgery will take about 20 to 40 minutes. Going home · Be sure you have someone to drive you home. Anesthesia and pain medicine make it unsafe for you to drive.  
  · You will be given more specific instructions about recovering from your surgery. They will cover things like diet, wound care, follow-up care, driving, and getting back to your normal routine.  
  · You may have a bandage or patch over your eye. You may also have a clear shield over your eye. This prevents you from rubbing it. When should you call your doctor? · You have questions or concerns.  
  · You don't understand how to prepare for your surgery.  
  · You become ill before the surgery (such as fever, flu, or a cold).  
  · You need to reschedule or have changed your mind about having the surgery. Where can you learn more? Go to http://librado-alaina.info/. Enter K474 in the search box to learn more about \"Cataract Surgery: Before Your Surgery. \" Current as of: May 5, 2019 Content Version: 12.2 © 0963-8806 [employer] [place] [employer]. Care instructions adapted under license by [employer] (which disclaims liability or warranty for this information). If you have questions about a medical condition or this instruction, always ask your healthcare professional. [address] any warranty or liability for your use of this information.

## 2020-02-26 NOTE — TELEPHONE ENCOUNTER
Pre-Op completed today 02/26 needs to be faxed to [phone] as the patients surgery is 7:15am on 02/27.     Call Blanca Alvarez with questions or concerns at [phone]

## 2020-02-26 NOTE — PROGRESS NOTES
Chief Complaint   Patient presents with    Surgical Clearance     Cataract surgery       Pt is a 65y.o. year old female who presents for follow up of her chronic medical problems  Last INR 1.7 last week  On Coumdin n2 mg daily-  Skipped her coumadin last night  Cataract surgery in AM left eye-cannot see well despite eyeglasses    BP Readings from Last 3 Encounters:   02/26/20 94/52   10/01/19 106/55   01/09/19 112/62   repeat BP-better      Lab Results   Component Value Date/Time    TSH 1.570 10/01/2019 10:41 AM     ROS:    Pt denies: Wt loss, Fever/Chills, HA, Visual changes, Fatigue, Chest pain, SOB, GONZALES, Abd pain, N/V/D/C, Blood in stool or urine, Edema. Pertinent positive as above in HPI.  All others were negative    Patient Active Problem List   Diagnosis Code    Hypercholesterolemia E78.00    Vitamin D deficiency E55.9    Incidental lung nodule, less than or equal to 3mm R91.1    Adrenal nodule ([facility] [address] E27.9    Hypothyroidism due to acquired atrophy of thyroid E03.4    Renal cyst N28.1    Advance directive discussed with patient Z71.89    Chronic atrial fibrillation I48.20    Neurofibromatosis ([address] Q85.00    Age-related osteoporosis without current pathological fracture M81.0    Essential hypertension I10    Non-rheumatic mitral valve stenosis I34.2    Mediastinal lymphadenopathy R59.0    Anticoagulant long-term use Z79.01       Past Medical History:   Diagnosis Date    A-fib ([address]     Arrhythmia     atrial fibrillation/has mitral valve disease from hx of rheumatic fever    Contact dermatitis and other eczema, due to unspecified cause     Headache(784.0)     hx migraines    Hypercholesterolemia     Hypertension     Neurofibromatosis, unspecified     Renal mass 11/6/2012    Thyroid disease        Current Outpatient Medications   Medication Sig Dispense Refill    levothyroxine (SYNTHROID) 50 mcg tablet TAKE 1 TABLET EVERY DAY BEFORE BREAKFAST 90 Tab 3    ferrous sulfate 325 mg (65 mg iron) tablet Take 325 mg by mouth every seven (7) days.  furosemide (LASIX) 40 mg tablet Take 60 mg by mouth daily.  metoprolol tartrate (LOPRESSOR) 100 mg IR tablet Take 100 mg by mouth two (2) times a day.  potassium chloride SR (KLOR-CON 10) 10 mEq tablet Take 1 Tab by mouth daily. 90 Tab 3    warfarin (COUMADIN) 2 mg tablet Take 2 mg by mouth daily. Social History     Tobacco Use   Smoking Status Never Smoker   Smokeless Tobacco Never Used       Allergies   Allergen Reactions    Codeine Nausea Only    Statins-Hmg-Coa Reductase Inhibitors Other (comments)     Leg pains, bone pains       Patient Labs were reviewed: yes      Patient Past Records were reviewed:  yes        Objective:     Vitals:    02/26/20 1012   BP: 94/52   Pulse: (!) 54   Resp: 15   Temp: 95.2 °F (35.1 °C)   TempSrc: Oral   SpO2: 96%   Weight: 107 lb 3.2 oz (48.6 kg)   Height: 5' 1\" (1.549 m)     Body mass index is 20.26 kg/m². Exam:   Appearance: alert, well appearing,  oriented to person, place, and time, acyanotic, in no respiratory distress and well hydrated. HEENT:  NC/AT, pink conj, anicteric sclerae  Neck:  No cervical lymphadenopathy, no JVD, no thyromegaly, no carotid bruit  Heart:  RRR without M/R/G  Lungs:  CTAB, no rhonchi, rales, or wheezes with good air exchange   Abdomen:  Non-tender, pos bowel sounds, no hepatosplenomegaly  Ext:  No C/C/E    Skin: no rash  Neuro: no lateralizing signs, CNs II-XII intact      Assessment/ Plan:   Diagnoses and all orders for this visit:    1. Pre-operative clearance-medically cleared for surgery    2. Senile cataract of left eye, unspecified age-related cataract type-symptomatic    3. Chronic atrial fibrillation-continue with Coumadin after the cataract surgery    4. Essential hypertension-continue with present meds    5. Hypothyroidism due to acquired atrophy of thyroid    6. Adrenal nodule (HCC)-stable on last Ct    7.  Neurofibromatosis (HCC)-stable        Follow-up and Dispositions    · Return for previous appt. I have discussed the diagnosis with the patient and the intended plan as seen in the above orders. The patient has received an After-Visit Summary and questions were answered concerning future plans. Medication Side Effects and Warnings were discussed with patient: yes    Patient verbalized understanding of above instructions.     Shar Randall MD  Internal Medicine  [facility]

## 2020-02-26 NOTE — PROGRESS NOTES
Chief Complaint   Patient presents with    Surgical Clearance     Cataract surgery     1. Have you been to the ER, urgent care clinic since your last visit? Hospitalized since your last visit? No    2. Have you seen or consulted any other health care providers outside of the [address] [place] [address] since your last visit? Include any pap smears or colon screening.  No

## 2020-04-07 ENCOUNTER — VIRTUAL VISIT (OUTPATIENT)
Dept: FAMILY MEDICINE CLINIC | Age: 70
End: 2020-04-07

## 2020-04-07 NOTE — PROGRESS NOTES
Chief Complaint   Patient presents with   Yue Sanchez Annual Wellness Visit       1. Have you been to the ER, urgent care clinic since your last visit? Hospitalized since your last visit? NO    2. Have you seen or consulted any other health care providers outside of the [address] [place] [address] since your last visit? Include any pap smears or colon screening.  NO

## 2020-04-07 NOTE — PROGRESS NOTES
Consent: Adela Gaona, who was seen by synchronous (real-time) audio-video technology, and/or her healthcare decision maker, is aware that this patient-initiated, Telehealth encounter on 4/7/2020 is a billable service, with coverage as determined by her insurance carrier. She is aware that she may receive a bill and has provided verbal consent to proceed: Yes. Assessment & Plan:   Diagnoses and all orders for this visit:    1. ERRONEOUS ENCOUNTER--DISREGARD          Follow-up and Dispositions    · Return in about 6 months (around 10/7/2020) for follow up. 712  Subjective:   Adela Gaona is a 65 y.o. female who was seen for Annual Wellness Visit and Erroneous encounter-disregard    Health Maintenance Due   Topic Date Due    Breast Cancer Screen Mammogram  03/21/2019    Colonoscopy  01/01/2020       Prior to Admission medications    Medication Sig Start Date End Date Taking? Authorizing Provider   levothyroxine (SYNTHROID) 50 mcg tablet TAKE 1 TABLET EVERY DAY BEFORE BREAKFAST 3/9/20  Yes Olinda Watt MD   ferrous sulfate 325 mg (65 mg iron) tablet Take 325 mg by mouth every seven (7) days. 5/17/18  Yes Provider, Historical   furosemide (LASIX) 40 mg tablet Take 60 mg by mouth daily. 5/17/18  Yes Provider, Historical   metoprolol tartrate (LOPRESSOR) 100 mg IR tablet Take 100 mg by mouth two (2) times a day. 5/16/18  Yes Provider, Historical   potassium chloride SR (KLOR-CON 10) 10 mEq tablet Take 1 Tab by mouth daily. 8/26/13  Yes Olinda Watt MD   warfarin (COUMADIN) 2 mg tablet Take 2 mg by mouth daily. Yes Provider, Historical     Allergies   Allergen Reactions    Codeine Nausea Only    Statins-Hmg-Coa Reductase Inhibitors Other (comments)     Leg pains, bone pains           ROS      Objective: There were no vitals taken for this visit.    General: alert, cooperative, no distress   Mental  status: normal mood, behavior, speech, dress, motor activity, and thought processes, able to follow commands   HENT: NCAT   Neck: no visualized mass   Resp: no respiratory distress   Neuro: no gross deficits   Skin: no discoloration or lesions of concern on visible areas   Psychiatric: normal affect, consistent with stated mood, no evidence of hallucinations     Additional exam findings: We discussed the expected course, resolution and complications of the diagnosis(es) in detail. Medication risks, benefits, costs, interactions, and alternatives were discussed as indicated. I advised her to contact the office if her condition worsens, changes or fails to improve as anticipated. She expressed understanding with the diagnosis(es) and plan. Holland Quiñones is a 65 y.o. female being evaluated by a video visit encounter for concerns as above. A caregiver was present when appropriate. Due to this being a TeleHealth encounter (During EXOK-19 public health emergency), evaluation of the following organ systems was limited: Vitals/Constitutional/EENT/Resp/CV/GI//MS/Neuro/Skin/Heme-Lymph-Imm. Pursuant to the emergency declaration under the [facility], [facility] waiver authority and the [employer] Act, this Virtual  Visit was conducted, with patient's (and/or legal guardian's) consent, to reduce the patient's risk of exposure to COVID-19 and provide necessary medical care. Services were provided through a video synchronous discussion virtually to substitute for in-person clinic visit. Patient and provider were located at their individual homes. Caitlyn Benoit MD    A user error has taken place: encounter opened in error, closed for administrative reasons.  Patient unable to connect for the virtual visit

## 2020-04-08 ENCOUNTER — VIRTUAL VISIT (OUTPATIENT)
Dept: FAMILY MEDICINE CLINIC | Age: 70
End: 2020-04-08

## 2020-04-08 NOTE — PROGRESS NOTES
Chief Complaint   Patient presents with    Surgical Clearance     1. Have you been to the ER, urgent care clinic since your last visit? Hospitalized since your last visit? No    2. Have you seen or consulted any other health care providers outside of the [address] [place] [address] since your last visit? Include any pap smears or colon screening.  Yes Where: [facility]

## 2020-04-08 NOTE — PROGRESS NOTES
Consent: Pedro Pride, who was seen by synchronous (real-time) audio-video technology, and/or her healthcare decision maker, is aware that this patient-initiated, Telehealth encounter on 4/8/2020 is a billable service, with coverage as determined by her insurance carrier. She is aware that she may receive a bill and has provided verbal consent to proceed: Yes. Assessment & Plan:   Diagnoses and all orders for this visit:    1. ERRONEOUS ENCOUNTER--DISREGARD                  292  Subjective:   Pedro Pride is a 65 y.o. female who was seen for Surgical Clearance and Erroneous encounter-disregard      Prior to Admission medications    Medication Sig Start Date End Date Taking? Authorizing Provider   levothyroxine (SYNTHROID) 50 mcg tablet TAKE 1 TABLET EVERY DAY BEFORE BREAKFAST 3/9/20  Yes Olinda Watt MD   ferrous sulfate 325 mg (65 mg iron) tablet Take 325 mg by mouth every seven (7) days. 5/17/18  Yes Provider, Historical   furosemide (LASIX) 40 mg tablet Take 60 mg by mouth daily. 5/17/18  Yes Provider, Historical   metoprolol tartrate (LOPRESSOR) 100 mg IR tablet Take 100 mg by mouth two (2) times a day. 5/16/18  Yes Provider, Historical   potassium chloride SR (KLOR-CON 10) 10 mEq tablet Take 1 Tab by mouth daily. 8/26/13  Yes Olinda Watt MD   warfarin (COUMADIN) 2 mg tablet Take 2 mg by mouth daily. Yes Provider, Historical     Allergies   Allergen Reactions    Codeine Nausea Only    Statins-Hmg-Coa Reductase Inhibitors Other (comments)     Leg pains, bone pains           ROS      Objective: There were no vitals taken for this visit.    General: alert, cooperative, no distress   Mental  status: normal mood, behavior, speech, dress, motor activity, and thought processes, able to follow commands   HENT: NCAT   Neck: no visualized mass   Resp: no respiratory distress   Neuro: no gross deficits   Skin: no discoloration or lesions of concern on visible areas   Psychiatric: normal affect, consistent with stated mood, no evidence of hallucinations     Additional exam findings: We discussed the expected course, resolution and complications of the diagnosis(es) in detail. Medication risks, benefits, costs, interactions, and alternatives were discussed as indicated. I advised her to contact the office if her condition worsens, changes or fails to improve as anticipated. She expressed understanding with the diagnosis(es) and plan. Nelia Dahl is a 65 y.o. female being evaluated by a video visit encounter for concerns as above. A caregiver was present when appropriate. Due to this being a TeleHealth encounter (During BDJ-86 public health emergency), evaluation of the following organ systems was limited: Vitals/Constitutional/EENT/Resp/CV/GI//MS/Neuro/Skin/Heme-Lymph-Imm. Pursuant to the emergency declaration under the [facility], 1135 waiver authority and the [employer] Act, this Virtual  Visit was conducted, with patient's (and/or legal guardian's) consent, to reduce the patient's risk of exposure to COVID-19 and provide necessary medical care. Services were provided through a video synchronous discussion virtually to substitute for in-person clinic visit. Patient and provider were located at their individual homes. Jong Freitas MD    A user error has taken place: encounter opened in error, closed for administrative reasons. Unable to connect again!

## 2020-04-14 ENCOUNTER — OFFICE VISIT (OUTPATIENT)
Dept: FAMILY MEDICINE CLINIC | Age: 70
End: 2020-04-14

## 2020-04-14 VITALS
TEMPERATURE: 96.7 F | WEIGHT: 103.6 LBS | OXYGEN SATURATION: 99 % | SYSTOLIC BLOOD PRESSURE: 132 MMHG | DIASTOLIC BLOOD PRESSURE: 52 MMHG | HEIGHT: 61 IN | RESPIRATION RATE: 16 BRPM | HEART RATE: 85 BPM | BODY MASS INDEX: 19.56 KG/M2

## 2020-04-14 DIAGNOSIS — I48.20 CHRONIC ATRIAL FIBRILLATION (HCC): ICD-10-CM

## 2020-04-14 DIAGNOSIS — Z01.818 PRE-OPERATIVE CLEARANCE: Primary | ICD-10-CM

## 2020-04-14 DIAGNOSIS — G91.2 NORMAL PRESSURE HYDROCEPHALUS (HCC): ICD-10-CM

## 2020-04-14 DIAGNOSIS — E03.4 HYPOTHYROIDISM DUE TO ACQUIRED ATROPHY OF THYROID: ICD-10-CM

## 2020-04-14 DIAGNOSIS — Q85.00 NEUROFIBROMATOSIS (HCC): ICD-10-CM

## 2020-04-14 DIAGNOSIS — H26.9 CATARACT OF RIGHT EYE, UNSPECIFIED CATARACT TYPE: ICD-10-CM

## 2020-04-14 DIAGNOSIS — I10 ESSENTIAL HYPERTENSION: ICD-10-CM

## 2020-04-14 NOTE — PROGRESS NOTES
Chief Complaint   Patient presents with    Surgical Clearance     [facility]       Pt is a 65y.o. year old female who presents for pre op clearance for right eye surgery tomorrow with Dr Juan Earl    Patient has persistent BOV on the right eye despite using her eyeglasses  Apparently has to have the eye \"polished first\" prior to the cataract surgery   Already had successful left cataract surgery  No eye pain      Afib-on Coumadin home monitoring c/o Cardio  Denies CP, palpitations, dizziness    Saw neuro for gait instability-? NPH, recheck in 6 months    Lab Results   Component Value Date/Time    TSH 1.570 10/01/2019 10:41 AM   on Synthroid    BP Readings from Last 3 Encounters:   04/14/20 132/52   02/26/20 94/52   10/01/19 106/55   On Metoprolol BID-compliant    Lab Results   Component Value Date/Time    Potassium 4.6 10/01/2019 10:41 AM   on K 10 meq daily    ROS:    Pt denies: Wt loss, Fever/Chills, HA, Visual changes, Fatigue, Chest pain, SOB, GONZALES, Abd pain, N/V/D/C, Blood in stool or urine, Edema. Pertinent positive as above in HPI.  All others were negative    Patient Active Problem List   Diagnosis Code    Hypercholesterolemia E78.00    Vitamin D deficiency E55.9    Incidental lung nodule, less than or equal to 3mm R91.1    Adrenal nodule ([address] E27.9    Hypothyroidism due to acquired atrophy of thyroid E03.4    Renal cyst N28.1    Advance directive discussed with patient Z71.89    Chronic atrial fibrillation I48.20    Neurofibromatosis ([address] Q85.00    Age-related osteoporosis without current pathological fracture M81.0    Essential hypertension I10    Non-rheumatic mitral valve stenosis I34.2    Mediastinal lymphadenopathy R59.0    Anticoagulant long-term use Z79.01       Past Medical History:   Diagnosis Date    A-fib ([address]     Arrhythmia     atrial fibrillation/has mitral valve disease from hx of rheumatic fever    Contact dermatitis and other eczema, due to unspecified cause     Headache(784.0)     hx migraines    Hypercholesterolemia     Hypertension     Neurofibromatosis, unspecified     Renal mass 11/6/2012    Thyroid disease        Current Outpatient Medications   Medication Sig Dispense Refill    levothyroxine (SYNTHROID) 50 mcg tablet TAKE 1 TABLET EVERY DAY BEFORE BREAKFAST 90 Tab 3    ferrous sulfate 325 mg (65 mg iron) tablet Take 325 mg by mouth every seven (7) days.  furosemide (LASIX) 40 mg tablet Take 60 mg by mouth daily.  metoprolol tartrate (LOPRESSOR) 100 mg IR tablet Take 100 mg by mouth two (2) times a day.  potassium chloride SR (KLOR-CON 10) 10 mEq tablet Take 1 Tab by mouth daily. 90 Tab 3    warfarin (COUMADIN) 2 mg tablet Take 2 mg by mouth daily. Social History     Tobacco Use   Smoking Status Never Smoker   Smokeless Tobacco Never Used       Allergies   Allergen Reactions    Codeine Nausea Only    Statins-Hmg-Coa Reductase Inhibitors Other (comments)     Leg pains, bone pains       Patient Labs were reviewed: yes      Patient Past Records were reviewed:  yes        Objective:     Vitals:    04/14/20 0953   BP: 132/52   Pulse: 85   Resp: 16   Temp: 96.7 °F (35.9 °C)   SpO2: 99%   Weight: 103 lb 9.6 oz (47 kg)   Height: 5' 1\" (1.549 m)     Body mass index is 19.58 kg/m². Exam:   Appearance: alert, well appearing,  oriented to person, place, and time, acyanotic, in no respiratory distress and well hydrated. HEENT:  NC/AT, pink conj, anicteric sclerae, right lens opacity  Neck:  No cervical lymphadenopathy, no JVD, no thyromegaly, no carotid bruit  Heart:  RRR without M/R/G  Lungs:  CTAB, no rhonchi, rales, or wheezes with good air exchange   Abdomen:  Non-tender, pos bowel sounds, no hepatosplenomegaly  Ext:  No C/C/E    Skin: no rash, neurofibromas all over  Neuro: no lateralizing signs, CNs II-XII intact      Assessment/ Plan:   Diagnoses and all orders for this visit:    1.  Pre-operative clearance-patient is medically cleared for right eye surgery under local anesthesia; has discontinued her Coumadin since yesterday    2. Cataract of right eye, unspecified cataract type-for surgery     3. Chronic atrial fibrillation-advised to restart Coumadin the following day after her eye surgery    4. Essential hypertension-controlled, continue with Metoprolol; if K is still over 4 next visit, will discontinue with K pills    5. Hypothyroidism due to acquired atrophy of thyroid-continue with Synthroid    6. Neurofibromatosis (HCC)-asymptomatic    7. Normal pressure hydrocephalus (HCC) -will review Neuro note                I have discussed the diagnosis with the patient and the intended plan as seen in the above orders. The patient has received an After-Visit Summary and questions were answered concerning future plans. Medication Side Effects and Warnings were discussed with patient: yes    Patient verbalized understanding of above instructions.     Nelli Becker MD  Internal Medicine  [facility]

## 2020-04-14 NOTE — PATIENT INSTRUCTIONS
Cataract Surgery: Before Your Surgery  What is cataract surgery? Cataracts are cloudy areas in the lens of your eye. Your lens is behind the colored part of your eye (iris). Its job is to focus light onto the back of your eye. In some people, cataracts prevent light from reaching the back of the eye. This can cause vision problems. Cataract surgery helps you see better. It replaces your natural lens, which has become cloudy, with a clear artificial one. There are two types of cataract surgery. Phacoemulsification (say \"yqxl-fm-up-ofe-zbs-niw-MICHOACANO-shun\") is the most common type. The doctor makes a small cut in your eye. This cut is called an incision. The doctor uses a special ultrasound tool to break your cloudy lens apart. Sometimes a laser is used too. Then he or she removes the small pieces of the lens through the incision. In most cases, the doctor then inserts an artificial lens through the incision. Most people do not need stitches, because the incision is so small. If the doctor is not able to put in an artificial lens, you can wear a contact lens or thick glasses in place of your natural lens. Extracapsular extraction is a less common type of cataract surgery. The doctor makes a larger incision to remove the whole lens at once. After the doctor removes the lens, he or she stitches up the incision. Recovery from this type of surgery takes longer. Before either surgery, the doctor puts numbing drops in your eye. Some doctors use a shot instead. You may also get medicine to make you feel relaxed. You probably will not feel much pain. The surgery takes about 20 to 40 minutes. After surgery, you may have a bandage or shield on your eye. You will probably go home from surgery after 1 hour in the recovery room. Most people see better in 1 to 3 days. You may be able to go back to work or your normal routine in a few days.  It could take 3 to 10 weeks for your eye to completely heal. After your eye heals, you may still need to wear glasses, especially for reading. Follow-up care is a key part of your treatment and safety. Be sure to make and go to all appointments, and call your doctor if you are having problems. It's also a good idea to know your test results and keep a list of the medicines you take. What happens before surgery?   Surgery can be stressful. This information will help you understand what you can expect. And it will help you safely prepare for surgery.   Preparing for surgery    · Understand exactly what surgery is planned, along with the risks, benefits, and other options. · Tell your doctors ALL the medicines, vitamins, supplements, and herbal remedies you take. Some of these can increase the risk of bleeding or interact with anesthesia.     · If you take aspirin or some other blood thinner, ask your doctor if you should stop taking it before your surgery. Make sure that you understand exactly what your doctor wants you to do. These medicines increase the risk of bleeding.     · Your doctor will tell you which medicines to take or stop before your surgery. You may need to stop taking certain medicines a week or more before surgery. So talk to your doctor as soon as you can.     · If you have an advance directive, let your doctor know. It may include a living will and a durable power of  for health care. Bring a copy to the hospital. If you don't have one, you may want to prepare one. It lets your doctor and loved ones know your health care wishes. Doctors advise that everyone prepare these papers before any type of surgery or procedure. What happens on the day of surgery? · Follow the instructions exactly about when to stop eating and drinking. If you don't, your surgery may be canceled. If your doctor told you to take your medicines on the day of surgery, take them with only a sip of water.     · Take a bath or shower before you come in for your surgery.  Do not apply lotions, perfumes, deodorants, or nail polish.     · Take off all jewelry and piercings. And take out contact lenses, if you wear them.    At the hospital or surgery center   · Bring a picture ID.     · The area for surgery is often marked to make sure there are no errors.     · You will be kept comfortable and safe by your anesthesia provider. The anesthesia may make you sleep. Or it may just numb the area being worked on.     · The surgery will take about 20 to 40 minutes. Going home   · Be sure you have someone to drive you home. Anesthesia and pain medicine make it unsafe for you to drive.     · You will be given more specific instructions about recovering from your surgery. They will cover things like diet, wound care, follow-up care, driving, and getting back to your normal routine.     · You may have a bandage or patch over your eye. You may also have a clear shield over your eye. This prevents you from rubbing it. When should you call your doctor? · You have questions or concerns.     · You don't understand how to prepare for your surgery.     · You become ill before the surgery (such as fever, flu, or a cold).     · You need to reschedule or have changed your mind about having the surgery. Where can you learn more? Go to http://librado-alaina.info/  Enter K474 in the search box to learn more about \"Cataract Surgery: Before Your Surgery. \"  Current as of: December 17, 2019Content Version: 12.4  © 1918-7149 [employer]. Care instructions adapted under license by [employer] (which disclaims liability or warranty for this information). If you have questions about a medical condition or this instruction, always ask your healthcare professional. [place] [address] any warranty or liability for your use of this information.

## 2020-04-14 NOTE — PROGRESS NOTES
Chief Complaint   Patient presents with   [facility]     1. Have you been to the ER, urgent care clinic since your last visit? Hospitalized since your last visit? No    2. Have you seen or consulted any other health care providers outside of the [address] [place] [address] since your last visit? Include any pap smears or colon screening. No     Scheduled for eye surgery 4/20/2020.

## 2020-05-07 ENCOUNTER — TELEPHONE (OUTPATIENT)
Dept: FAMILY MEDICINE CLINIC | Age: 70
End: 2020-05-07

## 2020-12-25 PROBLEM — K52.9 COLITIS: Status: ACTIVE | Noted: 2020-12-25

## 2020-12-25 PROBLEM — R19.7 DIARRHEA: Status: ACTIVE | Noted: 2020-12-25

## 2020-12-25 PROBLEM — A41.9 SEPSIS (HCC): Status: ACTIVE | Noted: 2020-12-25

## 2020-12-25 PROBLEM — R11.2 INTRACTABLE NAUSEA AND VOMITING: Status: ACTIVE | Noted: 2020-12-25

## 2020-12-25 PROBLEM — K52.9 ILEITIS: Status: ACTIVE | Noted: 2020-12-25

## 2020-12-25 PROBLEM — R10.9 ABDOMINAL PAIN: Status: ACTIVE | Noted: 2020-12-25

## 2020-12-25 PROBLEM — I48.91 ATRIAL FIBRILLATION WITH RVR (HCC): Status: ACTIVE | Noted: 2020-12-25

## 2021-10-08 PROBLEM — I48.91 ATRIAL FIBRILLATION WITH CONTROLLED VENTRICULAR RATE (HCC): Status: ACTIVE | Noted: 2021-10-08

## 2021-10-08 PROBLEM — I50.9 ACUTE CHF (CONGESTIVE HEART FAILURE) (HCC): Status: ACTIVE | Noted: 2021-10-08

## 2022-10-13 PROBLEM — I50.33 ACUTE ON CHRONIC DIASTOLIC HEART FAILURE (HCC): Status: ACTIVE | Noted: 2022-10-13

## 2022-10-13 PROBLEM — Z99.81 HYPOXEMIA REQUIRING SUPPLEMENTAL OXYGEN: Status: ACTIVE | Noted: 2022-10-13

## 2022-10-13 PROBLEM — R06.03 RESPIRATORY DISTRESS: Status: ACTIVE | Noted: 2022-10-13

## 2022-10-13 PROBLEM — I50.9 ACUTE CONGESTIVE HEART FAILURE (HCC): Status: ACTIVE | Noted: 2022-10-13

## 2022-10-13 PROBLEM — R09.02 HYPOXEMIA REQUIRING SUPPLEMENTAL OXYGEN: Status: ACTIVE | Noted: 2022-10-13

## 2022-10-13 PROBLEM — D72.829 LEUKOCYTOSIS: Status: ACTIVE | Noted: 2022-10-13

## 2022-10-13 PROBLEM — J18.9 COMMUNITY ACQUIRED PNEUMONIA: Status: ACTIVE | Noted: 2022-10-13

## 2022-10-13 PROBLEM — I34.2 NON-RHEUMATIC MITRAL VALVE STENOSIS: Chronic | Status: ACTIVE | Noted: 2018-07-05

## 2022-10-13 PROBLEM — I48.21 PERMANENT ATRIAL FIBRILLATION (HCC): Status: ACTIVE | Noted: 2020-12-25
